# Patient Record
Sex: FEMALE
[De-identification: names, ages, dates, MRNs, and addresses within clinical notes are randomized per-mention and may not be internally consistent; named-entity substitution may affect disease eponyms.]

---

## 2023-05-09 ENCOUNTER — HOSPITAL ENCOUNTER (EMERGENCY)
Dept: HOSPITAL 97 - ER | Age: 38
Discharge: HOME | End: 2023-05-09
Payer: COMMERCIAL

## 2023-05-09 VITALS — OXYGEN SATURATION: 99 % | TEMPERATURE: 98 F | DIASTOLIC BLOOD PRESSURE: 72 MMHG | SYSTOLIC BLOOD PRESSURE: 118 MMHG

## 2023-05-09 DIAGNOSIS — N39.0: ICD-10-CM

## 2023-05-09 DIAGNOSIS — G40.89: Primary | ICD-10-CM

## 2023-05-09 DIAGNOSIS — S20.213A: ICD-10-CM

## 2023-05-09 LAB
ALBUMIN SERPL BCP-MCNC: 3.8 G/DL (ref 3.4–5)
ALP SERPL-CCNC: 66 U/L (ref 45–117)
ALT SERPL W P-5'-P-CCNC: 18 U/L (ref 13–56)
AST SERPL W P-5'-P-CCNC: 14 U/L (ref 15–37)
BUN BLD-MCNC: 9 MG/DL (ref 7–18)
GLUCOSE SERPLBLD-MCNC: 133 MG/DL (ref 74–106)
HCT VFR BLD CALC: 40.6 % (ref 36–45)
INR BLD: 1.02
LYMPHOCYTES # SPEC AUTO: 0.8 K/UL (ref 0.7–4.9)
MCV RBC: 93.9 FL (ref 80–100)
METHAMPHET UR QL SCN: NEGATIVE
PMV BLD: 7.7 FL (ref 7.6–11.3)
POTASSIUM SERPL-SCNC: 3.7 MEQ/L (ref 3.5–5.1)
RBC # BLD: 4.32 M/UL (ref 3.86–4.86)
THC SERPL-MCNC: POSITIVE NG/ML

## 2023-05-09 PROCEDURE — 36415 COLL VENOUS BLD VENIPUNCTURE: CPT

## 2023-05-09 PROCEDURE — 85025 COMPLETE CBC W/AUTO DIFF WBC: CPT

## 2023-05-09 PROCEDURE — 96375 TX/PRO/DX INJ NEW DRUG ADDON: CPT

## 2023-05-09 PROCEDURE — 80307 DRUG TEST PRSMV CHEM ANLYZR: CPT

## 2023-05-09 PROCEDURE — 72125 CT NECK SPINE W/O DYE: CPT

## 2023-05-09 PROCEDURE — 93005 ELECTROCARDIOGRAM TRACING: CPT

## 2023-05-09 PROCEDURE — 81001 URINALYSIS AUTO W/SCOPE: CPT

## 2023-05-09 PROCEDURE — 85610 PROTHROMBIN TIME: CPT

## 2023-05-09 PROCEDURE — 70450 CT HEAD/BRAIN W/O DYE: CPT

## 2023-05-09 PROCEDURE — 85730 THROMBOPLASTIN TIME PARTIAL: CPT

## 2023-05-09 PROCEDURE — 84484 ASSAY OF TROPONIN QUANT: CPT

## 2023-05-09 PROCEDURE — 80076 HEPATIC FUNCTION PANEL: CPT

## 2023-05-09 PROCEDURE — 71045 X-RAY EXAM CHEST 1 VIEW: CPT

## 2023-05-09 PROCEDURE — 81025 URINE PREGNANCY TEST: CPT

## 2023-05-09 PROCEDURE — 96365 THER/PROPH/DIAG IV INF INIT: CPT

## 2023-05-09 PROCEDURE — 80048 BASIC METABOLIC PNL TOTAL CA: CPT

## 2023-05-09 PROCEDURE — 96367 TX/PROPH/DG ADDL SEQ IV INF: CPT

## 2023-05-09 PROCEDURE — 99284 EMERGENCY DEPT VISIT MOD MDM: CPT

## 2023-05-09 NOTE — XMS REPORT
Continuity of Care Document

                             Created on:May 9, 2023



Patient:MELVI AGUILAR

Sex:Female

:1985

External Reference #:[id number]





Demographics







                          Address                   [address] [place] [address]

 

                          Home Phone                [phone]

 

                          Work Phone                [phone]

 

                          Mobile Phone              [phone]

 

                          Email Address             [email or web address]

 

                          Preferred Language        English

 

                          Marital Status            Unknown

 

                          Congregational Affiliation     Unknown

 

                          Race                      Unknown

 

                          Additional Race(s)        Unavailable

 

                          Ethnic Group              Unknown









Author







                          Organization              [facility]

t

 

                          Address                   [address]



                                                    [place] [address]

 

                          Phone                     [phone]









Support







                Name            Relationship    Address         Phone

 

                MARYLOU FRIED               [address]    [phone]



                                                [place] [address] 

 

                ZAID AGUILAR               Unavailable     [phone]









Care Team Providers







                    Name                Role                Phone

 

                    VENEGASBRIAN RYAN Primary Care Physician Unavailable

 

                    WILFREDO ESCALONA      Attending Clinician Unavailable

 

                    Ebrahim FNP, Wilfredo  Attending Clinician [phone]

 

                    Unknown, Attending  Attending Clinician Unavailable

 

                    Doctor Unassigned, No Name Attending Clinician Unavailable

 

                    Tawana Sanchez RN      Attending Clinician Unavailable

 

                    Only, Ang Db Test   Attending Clinician Unavailable

 

                    Laurence Joe Attending Clinician [phone]

 

                    LAURENCE ADAMSON   Attending Clinician Unavailable

 

                    KAYLAH MENJIVAR    Attending Clinician Unavailable

 

                    Kaylah Orlando Attending Clinician [phone]

 

                    LORRAINE LANGSTON        Attending Clinician Unavailable

 

                    Kian GARCIAPLorraine    Attending Clinician [phone]

 

                    Provider, [facility] Attending Clinician Unavailable

 

                    Kahti Elias RN  Attending Clinician Unavailable

 

                    MINISTERIO BRYAN        Attending Clinician Unavailable

 

                    Irvin ARREOLA, Ministerio     Attending Clinician [phone]

 

                    Jay Jay Mckeon DO Attending Clinician [phone]

 

                    2, [facility]          Attending Clinician Unavailable

 

                    Heriberto ARREOLA, Mehdi Attending Clinician [phone]

 

                    MEHDI LOUIS    Attending Clinician Unavailable

 

                    Lab, [facility]  Attending Clinician Unavailable

 

                    Pinky Koch  Attending Clinician [phone]

 

                    PINKY LEE      Attending Clinician Unavailable

 

                    Meme Saldana RN      Attending Clinician Unavailable

 

                    JEANMARIE RODRIGUEZ Attending Clinician Unavailable

 

                    [facility] Attending Clinician Unavailable

 

                    BRIAN VENEGAS Attending Clinician Unavailable

 

                    RENÉE POWER  Attending Clinician Unavailable

 

                    SANDY RODRIGUEZ Attending Clinician Unavailable

 

                    SANDY RODRIGUEZ Attending Clinician Unavailable

 

                    KAYLAH MENJIVAR    Admitting Clinician Unavailable

 

                    SANDY RODRIGUEZ Admitting Clinician Unavailable









Payers







           Payer Name Policy Type Policy Number Effective Date Expiration Date S

ource







Problems







       Condition Condition Condition Status Onset  Resolution Last   Treating Co

mments 

Source



       Name   Details Category        Date   Date   Treatment Clinician        



                                                 Date                 

 

       Need for Need for Disease Active                              Unive

rs



       vaccinatio vaccinatio               1-19                               it

y of



       n with n with               00:00:                             [place]



       13-polyval 13-polyval               00                                 Me

dical



       ent    ent                                                     Branch



       pneumococc pneumococc                                                  



       al     al                                                      



       conjugate conjugate                                                  



       vaccine vaccine                                                  

 

       Need for Need for Disease Active                              Unive

rs



       vaccinatio vaccinatio               1-19                               it

y of



       n with n with               00:00:                             [place]



       13-polyval 13-polyval               00                                 Me

dical



       ent    ent                                                     Branch



       pneumococc pneumococc                                                  



       al     al                                                      



       conjugate conjugate                                                  



       vaccine vaccine                                                  

 

       ADHD   ADHD   Disease Active                              [facility]



       (attention (attention               1-15                               it

y of



       deficit deficit               00:00:                             [place]



       hyperactiv hyperactiv               00                                 Me

dical



       ity    ity                                                     Branch



       disorder), disorder),                                                  



       combined combined                                                  



       type   type                                                    

 

       Anxiety, Anxiety, Disease Active                              Unive

rs



       generalize generalize               1-15                               it

y of



       d      d                    00:00:                             [place]



                                   00                                 Medical



                                                                      Branch

 

       Stress at Stress at Disease Active                              Uni

vers



       home   home                 1-15                               ity of



                                   00:00:                             [place]



                                   00                                 Medical



                                                                      Branch

 

       Current Current Disease Active                              Univers



       mild   mild                 1-15                               ity of



       episode of episode of               00:00:                             [place]

[place]



       major  major                00                                 Medical



       depressive depressive                                                  Br

anch



       disorder, disorder,                                                  



       unspecifie unspecifie                                                  



       d whether d whether                                                  



       recurrent recurrent                                                  

 

       Adjustment Adjustment Disease Active                              U

nivers



       insomnia insomnia               1-15                               ity of



                                   00:00:                             [place]



                                   00                                 Medical



                                                                      Branch

 

       Bronchitis Bronchitis Disease Active                              U

nivers



                                   4-28                               ity of



                                   00:00:                             [place]



                                   00                                 Medical



                                                                      Branch

 

       Nose   Nose   Disease Active                              Univers



       congestion congestion               4-28                               it

y of



                                   00:00:                             [place]



                                   00                                 Medical



                                                                      Branch

 

       Exposure Exposure Disease Active                              Unive

rs



       to     to                   4-28                               ity of



       SARS-assoc SARS-assoc               00:00:                             Te

shantells



       iated  iated                00                                 Medical



       coronaviru coronaviru                                                  Br

anch



       s      s                                                       

 

       Acute  Acute  Disease Active 2020-                             Univers



       bacterial bacterial               3-17                               ity 

of



       pharyngiti pharyngiti               00:00:                             [place]

[place]



       s      s                    00                                 Medical



                                                                      Branch

 

       Sore   Sore   Disease Active 2020-                             Univers



       throat throat               3-17                               ity of



                                   00:00:                             [place]



                                   00                                 Medical



                                                                      Branch

 

       Nicotine Nicotine Disease Active -                             Unive

rs



       dependence dependence               3-17                               it

y of



       ,      ,                    00:00:                             [place]



       cigarettes cigarettes               00                                 Me

dical



       ,      ,                                                       Branch



       uncomplica uncomplica                                                  



       julius    julius                                                     

 

       Cough  Cough  Disease Active 2020-                             Univers



                                   3-17                               ity of



                                   00:00:                             [place]



                                   00                                 Medical



                                                                      Branch

 

       Status Status Disease Active 2019-                             [facility]



       post tubal post tubal               4-12                               it

y of



       ligation ligation               00:00:                             [place]



                                   00                                 Medical



                                                                      Branch

 

       Shoulder Shoulder Disease Active                              Unive

rs



       dystocia dystocia               4-12                               ity of



       during during               00:00:                             [place]



       labor and labor and               00                                 Medi

sapna



       delivery delivery                                                  Branch

 

       History of History of Disease Active 2019                             U

nivers



       seizure seizure               4-12                               ity of



                                   00:00:                             [place]



                                   00                                 Medical



                                                                      Branch

 

       H/O    H/O    Disease Active 2019-                             Univers



       maternal maternal               1-24                               ity of



       cardiomyop cardiomyop               00:00:                             Te

lorelei ramos,  athangie,                00                                 Medical



       currently currently                                                  Bran

ch



       pregnant, pregnant,                                                  



       third  third                                                   



       trimester trimester                                                  

 

       History of History of Disease Active 2018                      Overview

: Univers



       echocardio echocardio               1-                        Formattin

 ity of



       gram   gram                 00:00:                      g of this [place]



                                   00                          note   Medical



                                                               might be Branch



                                                               different 



                                                               from the 



                                                               original. 



                                                               See    



                                                               results 



                                                               from   



                                                               2018 in 



                                                               chart  



                                                               review 

 

       Cervical Cervical Disease Active 2018                      Overview: Un

leslie



       Papanicola Papanicola               1-                        Formattin

 ity of



       ou smear ou smear               00:00:                      g of this [place]

as



       negative negative               00                          note   Medica

l



       within within                                           might be Branch



       last 12 last 12                                           different 



       months months                                           from the 



                                                               original. 



                                                               2018 



                                                               neg pap 



                                                               and neg 



                                                               hpv see 



                                                               scanned 



                                                               records 

 

       Postpartum Postpartum Disease Active 2018                      Overview

: Univers



       cardiomyop cardiomyop               0-29                        Formattin

 ity of



       athy   athy                 00:00:                      g of this [place]



                                   00                          note   Medical



                                                               might be Branch



                                                               different 



                                                               from the 



                                                               original. 



                                                               Reported 



                                                               with 2008 



                                                               pregnancy 



                                                               2018 



                                                               normal 



                                                               echo, see 



                                                               scanned 



                                                               records 







Allergies, Adverse Reactions, Alerts







       Allergy Allergy Status Severity Reaction(s) Onset  Inactive Treating Comm

ents 

Source



       Name   Type                        Date   Date   Clinician        

 

       NO KNOWN Drug   Active                                           Univers



       ALLERGIE Class                                                   ity of



       S                                                              [facility]







Social History







           Social Habit Start Date Stop Date  Quantity   Comments   Source

 

           Exposure to 2023 Not sure              [facility]



           SARS-CoV-2 00:00:00   10:37:00                         [facility]



           (event)                                                Branch

 

           Alcohol intake 2022 0 /d                  [facility]



                      00:00:00   00:00:00                         [facility]

 

           Tobacco use and 2020 Smokeless tobacco            Un

iversity of



           exposure   00:00:00   00:00:00   non-user              [facility]

 

           Tobacco Comment 2018-10-29 2018-10-29 d[facility]            [facility]



                      00[facility]   00:00:00   she found out            [place] [facility]

al



                                            about pregnancy            Branch

 

           History of            2018 Cigarette Smoker            Universi

ty of



           tobacco use            00:00:00                         [facility]

 

           Sex Assigned At 1985                       [facility]

y of



           Birth      00:00:00   00:00:00                         [facility]









                Smoking Status  Start Date      Stop Date       Source

 

                Tobacco smoking consumption                                 [facility]                                         [facility]

 

                Occasional tobacco smoker 2020 00:00:00                 Un

iversity of [facility]







Medications







       Ordered Filled Start  Stop   Current Ordering Indication Dosage Frequency

 Signature

                    Comments            Components          Source



     Medication Medication Date Date Medication? Clinician                (SIG) 

          



     Name Name                                                   

 

     amoxicillin      2023- Yes       [id number] 875mg      Take 1        

   Univers



     875 mg      2-14 -25                          tablet by           ity of



     tablet      00:00: 05:59                          mouth in           [place]



               00   :00                           the            Medical



                                                  morning           Branch



                                                  and 1           



                                                  tablet in           



                                                  the            



                                                  evening.           



                                                  Do all           



                                                  this for           



                                                  10 days.           

 

     amoxicillin      2023- Yes       [id number] 875mg      Take 1        

   Univers



     875 mg      2-14 02-25                          tablet by           ity of



     tablet      00:00: 05:59                          mouth in           [place]



               00   :00                           the            Medical



                                                  morning           Branch



                                                  and 1           



                                                  tablet in           



                                                  the            



                                                  evening.           



                                                  Do all           



                                                  this for           



                                                  10 days.           

 

     ondansetron      2022- No             8mg       8 mg, Slow          

 Univers



     (ZOFRAN      --04                          IV Push,           ity of



     (PF))      18:45: 17:55                          ONCE, 1           [place]



     injection 8      00   :00                           dose, On           Medi

sapna



     mg                                           Wed 22           Branch



                                                  at 1345,           



                                                  ASAP           

 

     NaCl 0.9%      -0 - No             1000mL      at 999           Uni

vers



     (NS) bolus      5-04 05-04                          mL/hr,           ity of



     infusion      18:45: 20:16                          1,000 mL,           [place]

as



     1,000 mL      00   :00                           IV             Medical



                                                  Infusion,           Branch



                                                  ONCE, 1           



                                                  dose, On           



                                                  Wed 22           



                                                  at 1345,           



                                                  ASAP           

 

     dicyclomine      -0      Yes       [id number] 20mg      Take 1           U

nivers



     20 mg      5-04                               tablet by           ity of



     tablet      00:00:                               mouth 4           [place]



               00                                 (four)           Medical



                                                  times           Branch



                                                  daily as           



                                                  needed for           



                                                  Abdominal           



                                                  pain.           

 

     ondansetron      -0      Yes       [id number] 4mg       Take 1           U

nivers



     4 mg      5-04                               tablet by           ity of



     disintegrat      00:00:                               mouth           [place]



     ing tablet      00                                 every 8           Medica

l



                                                  (eight)           Branch



                                                  hours as           



                                                  needed for           



                                                  Nausea and           



                                                  Vomiting           



                                                  (N/V).           

 

     dicyclomine      -0      Yes       [id number] 20mg      Take 1           [facility]

[facility]



     20 mg      5-04                               tablet by           [facility]



     tablet      00:00:                               mouth 4           [place]



               00                                 (four)           [facility]



                                                  times           [facility]



                                                  daily as           



                                                  needed for           



                                                  Abdominal           



                                                  pain.           

 

     ondansetron      -0      Yes       [id number] 4mg       Take 1           [facility]

[facility]



     4 mg      5-04                               tablet by           [facility]



     disintegrat      00:00:                               mouth           [place]



     ing tablet      00                                 every 8           [facility]

l



                                                  (eight)           [facility]



                                                  hours as           



                                                  needed for           



                                                  Nausea and           



                                                  Vomiting           



                                                  (N/V).           

 

     dicyclomine      -0      Yes       [id number] 20mg      Take 1           [facility]

[facility]



     20 mg      5-04                               tablet by           [facility]



     tablet      00:00:                               mouth 4           [place]



               00                                 (four)           [facility]



                                                  times           [facility]



                                                  daily as           



                                                  needed for           



                                                  Abdominal           



                                                  pain.           

 

     ondansetron      2-0      Yes       [id number] 4mg       Take 1           [facility]

[facility]



     4 mg      5-04                               tablet by           [facility]



     disintegrat      00:00:                               mouth           [place]



     ing tablet      00                                 every 8           [facility]

l



                                                  (eight)           [facility]



                                                  hours as           



                                                  needed for           



                                                  Nausea and           



                                                  Vomiting           



                                                  (N/V).           

 

     dicyclomine      2-0      Yes       [id number] 20mg      Take 1           [facility]

[facility]



     20 mg      5-04                               tablet by           [facility]



     tablet      00:00:                               mouth 4           [place]



               00                                 (four)           [facility]



                                                  times           [facility]



                                                  daily as           



                                                  needed for           



                                                  Abdominal           



                                                  pain.           

 

     ondansetron      2-0      Yes       [id number] 4mg       Take 1           [facility]

[facility]



     4 mg      5-04                               tablet by           [facility]



     disintegrat      00:00:                               mouth           [place]



     ing tablet      00                                 every 8           [facility]

l



                                                  (eight)           [facility]



                                                  hours as           



                                                  needed for           



                                                  Nausea and           



                                                  Vomiting           



                                                  (N/V).           

 

     dicyclomine      2-0      Yes       [id number] 20mg      Take 1           [facility]

[facility]



     20 mg      5-04                               tablet by           [facility]



     tablet      00:00:                               mouth 4           [place]



               00                                 (four)           [facility]



                                                  times           [facility]



                                                  daily as           



                                                  needed for           



                                                  Abdominal           



                                                  pain.           

 

     ondansetron      -0      Yes       [id number] 4mg       Take 1           [facility]

[facility]



     4 mg      5-04                               tablet by           [facility]



     disintegrat      00:00:                               mouth           [place]



     ing tablet      00                                 every 8           [facility]

l



                                                  (eight)           [facility]



                                                  hours as           



                                                  needed for           



                                                  Nausea and           



                                                  Vomiting           



                                                  (N/V).           

 

     dicyclomine      -0      Yes       [id number] 20mg      Take 1           [facility]

[facility]



     20 mg      5-04                               tablet by           [facility]



     tablet      00:00:                               mouth 4           [place]



               00                                 (four)           [facility]



                                                  times           [facility]



                                                  daily as           



                                                  needed for           



                                                  Abdominal           



                                                  pain.           

 

     ondansetron      -0      Yes       [id number] 4mg       Take 1           [facility]

[facility]



     4 mg      5-04                               tablet by           [facility]



     disintegrat      00:00:                               mouth           [place]



     ing tablet      00                                 every 8           [facility]

l



                                                  (eight)           [facility]



                                                  hours as           



                                                  needed for           



                                                  Nausea and           



                                                  Vomiting           



                                                  (N/V).           

 

     dicyclomine      -0      Yes       [id number] 20mg      Take 1           [facility]

[facility]



     20 mg      5-04                               tablet by           [facility]



     tablet      00:00:                               mouth 4           [place]



               00                                 (four)           [facility]



                                                  times           [facility]



                                                  daily as           



                                                  needed for           



                                                  Abdominal           



                                                  pain.           

 

     ondansetron      -0      Yes       [id number] 4mg       Take 1           [facility]

[facility]



     4 mg      5-04                               tablet by           [facility]



     disintegrat      00:00:                               mouth           [place]



     ing tablet      00                                 every 8           [facility]

l



                                                  (eight)           [facility]



                                                  hours as           



                                                  needed for           



                                                  Nausea and           



                                                  Vomiting           



                                                  (N/V).           

 

     ondansetron      -      Yes       [id number] 4mg       Take 1           

[facility]



     4 mg      2-13                               tablet by           [facility]



     disintegrat      00:00:                               mouth           [place]



     ing tablet      00                                 every 8           [facility]

l



                                                  (eight)           [facility]



                                                  hours as           



                                                  needed for           



                                                  Nausea and           



                                                  Vomiting           



                                                  (N/V).           

 

     ondansetron      -      Yes       [id number] 4mg       Take 1           

[facility]



     4 mg      2-13                               tablet by           [facility]



     disintegrat      00:00:                               mouth           [place]



     ing tablet      00                                 every 8           [facility]

l



                                                  (eight)           [facility]



                                                  hours as           



                                                  needed for           



                                                  Nausea and           



                                                  Vomiting           



                                                  (N/V).           

 

     ondansetron      2021      Yes       [id number] 4mg       Take 1           

[facility]



     4 mg      2-13                               tablet by           [facility]



     disintegrat      00:00:                               mouth           [place]



     ing tablet      00                                 every 8           [facility]

l



                                                  (eight)           [facility]



                                                  hours as           



                                                  needed for           



                                                  Nausea and           



                                                  Vomiting           



                                                  (N/V).           

 

     ondansetron      2021      Yes       [id number] 4mg       Take 1           

[facility]



     4 mg      2-13                               tablet by           [facility]



     disintegrat      00:00:                               mouth           [place]



     ing tablet      00                                 every 8           [facility]

l



                                                  (eight)           [facility]



                                                  hours as           



                                                  needed for           



                                                  Nausea and           



                                                  Vomiting           



                                                  (N/V).           

 

     ondansetron      2021      Yes       [id number] 4mg       Take 1           

[facility]



     4 mg      2-13                               tablet by           [facility]



     disintegrat      00:00:                               mouth           [place]



     ing tablet      00                                 every 8           [facility]

l



                                                  (eight)           [facility]



                                                  hours as           



                                                  needed for           



                                                  Nausea and           



                                                  Vomiting           



                                                  (N/V).           

 

     ondansetron      2021      Yes       [id number] 4mg       Take 1           

[facility]



     4 mg      2-13                               tablet by           [facility]



     disintegrat      00:00:                               mouth           [place]



     ing tablet      00                                 every 8           [facility]

l



                                                  (eight)           [facility]



                                                  hours as           



                                                  needed for           



                                                  Nausea and           



                                                  Vomiting           



                                                  (N/V).           

 

     ondansetron      2021      Yes       [id number] 4mg       Take 1           

[facility]



     4 mg      2-13                               tablet by           [facility]



     disintegrat      00:00:                               mouth           [place]



     ing tablet      00                                 every 8           [facility]

l



                                                  (eight)           [facility]



                                                  hours as           



                                                  needed for           



                                                  Nausea and           



                                                  Vomiting           



                                                  (N/V).           

 

     ondansetron      2021      Yes       [id number] 4mg       Take 1           

[facility]



     4 mg      2-13                               tablet by           [facility]



     disintegrat      00:00:                               mouth           [place]



     ing tablet      00                                 every 8           [facility]

l



                                                  (eight)           [facility]



                                                  hours as           



                                                  needed for           



                                                  Nausea and           



                                                  Vomiting           



                                                  (N/V).           

 

     ondansetron      2021      Yes       [id number] 4mg       Take 1           

[facility]



     4 mg      2-13                               tablet by           [facility]



     disintegrat      00:00:                               mouth           [place]



     ing tablet      00                                 every 8           [facility]

l



                                                  (eight)           [facility]



                                                  hours as           



                                                  needed for           



                                                  Nausea and           



                                                  Vomiting           



                                                  (N/V).           

 

     ondansetron      2021      Yes       [id number] 4mg       Take 1           

[facility]



     4 mg      2-13                               tablet by           [facility]



     disintegrat      00:00:                               mouth           [place]



     ing tablet      00                                 every 8           [facility]

l



                                                  (eight)           [facility]



                                                  hours as           



                                                  needed for           



                                                  Nausea and           



                                                  Vomiting           



                                                  (N/V).           

 

     ondansetron      2021      Yes       [id number] 4mg       Take 1           

[facility]



     4 mg      2-13                               tablet by           [facility]



     disintegrat      00:00:                               mouth           [place]



     ing tablet      00                                 every 8           [facility]

l



                                                  (eight)           [facility]



                                                  hours as           



                                                  needed for           



                                                  Nausea and           



                                                  Vomiting           



                                                  (N/V).           

 

     ondansetron      2021      Yes       [id number] 4mg       Take 1           

[facility]



     4 mg      2-13                               tablet by           [facility]



     disintegrat      00:00:                               mouth           [place]



     ing tablet      00                                 every 8           [facility]

l



                                                  (eight)           [facility]



                                                  hours as           



                                                  needed for           



                                                  Nausea and           



                                                  Vomiting           



                                                  (N/V).           

 

     ondansetron      2021      Yes       [id number] 4mg       Take 1           

[facility]



     4 mg      2-13                               tablet by           [facility]



     disintegrat      00:00:                               mouth           [place]



     ing tablet      00                                 every 8           [facility]

l



                                                  (eight)           [facility]



                                                  hours as           



                                                  needed for           



                                                  Nausea and           



                                                  Vomiting           



                                                  (N/V).           

 

     ondansetron      2021      Yes       [id number] 4mg       Take 1           

[facility]



     4 mg      2-13                               tablet by           [facility]



     disintegrat      00:00:                               mouth           [place]



     ing tablet      00                                 every 8           [facility]

l



                                                  (eight)           [facility]



                                                  hours as           



                                                  needed for           



                                                  Nausea and           



                                                  Vomiting           



                                                  (N/V).           

 

     ondansetron      2021- No        [id number] 4mg       Take 1         

  [facility]



     4 mg      0-11 12-13                          tablet by           [facility]



     disintegrat      00:00: 00:00                          mouth           [place]

[facility]



     ing tablet      00   :00                           every 8           [facility]

l



                                                  (eight)           [facility]



                                                  hours as           



                                                  needed for           



                                                  Nausea and           



                                                  Vomiting           



                                                  (N/V).           

 

     amphetamine            Yes       [id number] 5mg       Take 1           

[facility]



     -dextroamph      1-15                               capsule by           [facility]

y of



     etamine      00:00:                               mouth           [place]



     (ADDERALL      00                                 every           [facility]



     XR) 5 mg 24                                         morning.           [facility]

[facility]



     hr capsule                                                        

 

     FLUoxetine            Yes       [id number] 10mg      Take 1           [facility]

[facility]



     10 mg      1-15                               capsule by           [facility]



     capsule      00:00:                               mouth           [place]



               00                                 daily.           [facility]



                                                                 [facility]

 

     traZODone            Yes       [id number] 50mg      Take 1           [facility]

[facility]



     50 mg      1-15                               tablet by           [facility]



     tablet      00:00:                               mouth at           [place]



               00                                 bedtime.           [facility]



                                                                 [facility]

 

     amphetamine            Yes       [id number] 5mg       Take 1           

[facility]



     -dextroamph      1-15                               capsule by           [facility]

y of



     etamine      00:00:                               mouth           [place]



     (ADDERALL      00                                 every           [facility]



     XR) 5 mg 24                                         morning.           [facility]

[facility]



     hr capsule                                                        

 

     FLUoxetine            Yes       [id number] 10mg      Take 1           [facility]

[facility]



     10 mg      1-15                               capsule by           [facility]



     capsule      00:00:                               mouth           [place]



               00                                 daily.           [facility]



                                                                 [facility]

 

     traZODone            Yes       [id number] 50mg      Take 1           [facility]

[facility]



     50 mg      1-15                               tablet by           [facility]



     tablet      00:00:                               mouth at           [place]



               00                                 bedtime.           [facility]



                                                                 [facility]

 

     amphetamine      0      Yes       [id number] 5mg       Take 1           

[facility]



     -dextroamph      1-15                               capsule by           [facility]

y of



     etamine      00:00:                               mouth           [place]



     (ADDERALL      00                                 every           [facility]



     XR) 5 mg 24                                         morning.           [facility]

[facility]



     hr capsule                                                        

 

     FLUoxetine            Yes       [id number] 10mg      Take 1           [facility]

[facility]



     10 mg      1-15                               capsule by           [facility]



     capsule      00:00:                               mouth           [place]



               00                                 daily.           [facility]



                                                                 [facility]

 

     traZODone            Yes       [id number] 50mg      Take 1           [facility]

[facility]



     50 mg      1-15                               tablet by           [facility]



     tablet      00:00:                               mouth at           [place]



               00                                 bedtime.           Medical



                                                                 Branch

 

     amphetamine            Yes       [id number] 5mg       Take 1           

[facility]



     -dextroamph      1-15                               capsule by           it

y of



     etamine      00:00:                               mouth           [place]



     (ADDERALL      00                                 every           Medical



     XR) 5 mg 24                                         morning.           [facility]

ch



     hr capsule                                                        

 

     FLUoxetine            Yes       [id number] 10mg      Take 1           U

nivers



     10 mg      1-15                               capsule by           [facility] of



     capsule      00:00:                               mouth           [place]



               00                                 daily.           Medical



                                                                 Branch

 

     traZODone            Yes       [id number] 50mg      Take 1           U

nivers



     50 mg      1-15                               tablet by           ity of



     tablet      00:00:                               mouth at           [place]



               00                                 bedtime.           Medical



                                                                 Branch

 

     amphetamine            Yes       [id number] 5mg       Take 1           

[facility]



     -dextroamph      1-15                               capsule by           it

y of



     etamine      00:00:                               mouth           [place]



     (ADDERALL      00                                 every           Medical



     XR) 5 mg 24                                         morning.           [facility]

ch



     hr capsule                                                        

 

     FLUoxetine            Yes       [id number] 10mg      Take 1           U

nivers



     10 mg      1-15                               capsule by           [facility] of



     capsule      00:00:                               mouth           [place]



               00                                 daily.           Medical



                                                                 Branch

 

     traZODone            Yes       [id number] 50mg      Take 1           U

nivers



     50 mg      1-15                               tablet by           ity of



     tablet      00:00:                               mouth at           [place]



               00                                 bedtime.           Medical



                                                                 Branch

 

     amphetamine            Yes       [id number] 5mg       Take 1           

[facility]



     -dextroamph      1-15                               capsule by           it

y of



     etamine      00:00:                               mouth           [place]



     (ADDERALL      00                                 every           Medical



     XR) 5 mg 24                                         morning.           [facility]

ch



     hr capsule                                                        

 

     FLUoxetine            Yes       [id number] 10mg      Take 1           U

nivers



     10 mg      1-15                               capsule by           [facility] of



     capsule      00:00:                               mouth           [place]



               00                                 daily.           Medical



                                                                 Branch

 

     traZODone            Yes       [id number] 50mg      Take 1           U

nivers



     50 mg      1-15                               tablet by           [facility] of



     tablet      00:00:                               mouth at           [place]



               00                                 bedtime.           Medical



                                                                 Branch

 

     amphetamine            Yes       [id number] 5mg       Take 1           

[facility]



     -dextroamph      1-15                               capsule by           it

y of



     etamine      00:00:                               mouth           [place]



     (ADDERALL      00                                 every           Medical



     XR) 5 mg 24                                         morning.           [facility]

ch



     hr capsule                                                        

 

     FLUoxetine            Yes       [id number] 10mg      Take 1           U

nivers



     10 mg      1-15                               capsule by           ity of



     capsule      00:00:                               mouth           [place]



               00                                 daily.           Medical



                                                                 Branch

 

     traZODone            Yes       [id number] 50mg      Take 1           U

nivers



     50 mg      1-15                               tablet by           ity of



     tablet      00:00:                               mouth at           [place]



               00                                 bedtime.           Medical



                                                                 Branch

 

     amphetamine            Yes       [id number] 5mg       Take 1           

[facility]



     -dextroamph      1-15                               capsule by           it

y of



     etamine      00:00:                               mouth           [place]



     (ADDERALL      00                                 every           Medical



     XR) 5 mg 24                                         morning.           [facility]

ch



     hr capsule                                                        

 

     FLUoxetine            Yes       [id number] 10mg      Take 1           U

nivers



     10 mg      1-15                               capsule by           ity of



     capsule      00:00:                               mouth           [place]



               00                                 daily.           Medical



                                                                 Branch

 

     traZODone            Yes       [id number] 50mg      Take 1           U

nivers



     50 mg      1-15                               tablet by           ity of



     tablet      00:00:                               mouth at           [place]



               00                                 bedtime.           Medical



                                                                 Branch

 

     amphetamine            Yes       [id number] 5mg       Take 1           

[facility]



     -dextroamph      1-15                               capsule by           it

y of



     etamine      00:00:                               mouth           [place]



     (ADDERALL      00                                 every           Medical



     XR) 5 mg 24                                         morning.           [facility]

ch



     hr capsule                                                        

 

     FLUoxetine            Yes       [id number] 10mg      Take 1           U

nivers



     10 mg      1-15                               capsule by           ity of



     capsule      00:00:                               mouth           [place]



               00                                 daily.           Medical



                                                                 Branch

 

     traZODone            Yes       [id number] 50mg      Take 1           U

nivers



     50 mg      1-15                               tablet by           ity of



     tablet      00:00:                               mouth at           [place]



               00                                 bedtime.           Medical



                                                                 Branch

 

     amphetamine      -0      Yes       [id number] 5mg       Take 1           

[facility]



     -dextroamph      1-15                               capsule by           it

y of



     etamine      00:00:                               mouth           [place]



     (ADDERALL      00                                 every           Medical



     XR) 5 mg 24                                         morning.           [facility]

ch



     hr capsule                                                        

 

     FLUoxetine      -0      Yes       [id number] 10mg      Take 1           U

nivers



     10 mg      1-15                               capsule by           [facility] of



     capsule      00:00:                               mouth           [place]



               00                                 daily.           Medical



                                                                 Branch

 

     traZODone      -0      Yes       [id number] 50mg      Take 1           U

nivers



     50 mg      1-15                               tablet by           [facility] of



     tablet      00:00:                               mouth at           [place]



               00                                 bedtime.           Medical



                                                                 Branch

 

     amphetamine      -0      Yes       [id number] 5mg       Take 1           

[facility]



     -dextroamph      1-15                               capsule by           it

y of



     etamine      00:00:                               mouth           [place]



     (ADDERALL      00                                 every           Medical



     XR) 5 mg 24                                         morning.           [facility]

ch



     hr capsule                                                        

 

     FLUoxetine      -0      Yes       [id number] 10mg      Take 1           U

nivers



     10 mg      1-15                               capsule by           [facility] of



     capsule      00:00:                               mouth           [place]



               00                                 daily.           Medical



                                                                 Branch

 

     traZODone      0      Yes       [id number] 50mg      Take 1           U

nivers



     50 mg      1-15                               tablet by           ity of



     tablet      00:00:                               mouth at           [place]



               00                                 bedtime.           Medical



                                                                 Branch

 

     amphetamine      -0      Yes       [id number] 5mg       Take 1           

[facility]



     -dextroamph      1-15                               capsule by           it

y of



     etamine      00:00:                               mouth           [place]



     (ADDERALL      00                                 every           Medical



     XR) 5 mg 24                                         morning.           [facility]

ch



     hr capsule                                                        

 

     FLUoxetine      -0      Yes       [id number] 10mg      Take 1           U

nivers



     10 mg      1-15                               capsule by           ity of



     capsule      00:00:                               mouth           [place]



               00                                 daily.           Medical



                                                                 Branch

 

     traZODone      -0      Yes       [id number] 50mg      Take 1           U

nivers



     50 mg      1-15                               tablet by           ity of



     tablet      00:00:                               mouth at           [place]



               00                                 bedtime.           Medical



                                                                 Branch

 

     amphetamine      -0      Yes       [id number] 5mg       Take 1           

[facility]



     -dextroamph      1-15                               capsule by           it

y of



     etamine      00:00:                               mouth           [place]



     (ADDERALL      00                                 every           Medical



     XR) 5 mg 24                                         morning.           [facility]

ch



     hr capsule                                                        

 

     FLUoxetine      -0      Yes       [id number] 10mg      Take 1           U

nivers



     10 mg      1-15                               capsule by           ity of



     capsule      00:00:                               mouth           [place]



               00                                 daily.           Medical



                                                                 Branch

 

     traZODone            Yes       [id number] 50mg      Take 1           U

nivers



     50 mg      1-15                               tablet by           ity of



     tablet      00:00:                               mouth at           [place]



               00                                 bedtime.           Medical



                                                                 Branch

 

     amphetamine            Yes       [id number] 5mg       Take 1           

[facility]



     -dextroamph      1-15                               capsule by           it

y of



     etamine      00:00:                               mouth           [place]



     (ADDERALL      00                                 every           Medical



     XR) 5 mg 24                                         morning.           [facility]

ch



     hr capsule                                                        

 

     FLUoxetine            Yes       [id number] 10mg      Take 1           U

nivers



     10 mg      1-15                               capsule by           ity of



     capsule      00:00:                               mouth           [place]



               00                                 daily.           Medical



                                                                 Branch

 

     traZODone            Yes       [id number] 50mg      Take 1           U

nivers



     50 mg      1-15                               tablet by           ity of



     tablet      00:00:                               mouth at           [place]



               00                                 bedtime.           Medical



                                                                 Branch







Immunizations







           Ordered    Filled Immunization Date       Status     Comments   [facility]

e



           Immunization Name Name                                        

 

           Pneumococcal 13            2021-01-15 Completed             [facility]

[facility]



           Conjugate, PCV13            00:00:00                         [place] [facility]

[facility]



           (Prevnar 13)                                             Branch

 

           Pneumococcal 13            2021-01-15 Completed             [facility]

y of



           Conjugate, PCV13            00:00:00                         [place] [facility]

[facility]



           (Prevnar 13)                                             [facility]

 

           Pneumococcal 13            2021-01-15 Completed             [facility]

y of



           Conjugate, PCV13            00:00:00                         [place] [facility]

[facility]



           (Prevnar 13)                                             [facility]anch

 

           Pneumococcal 13            2021-01-15 Completed             [facility]

[facility] of



           Conjugate, PCV13            00:00:00                         [place] [facility]

[facility]



           (Prevnar 13)                                             [facility]anch

 

           Pneumococcal 13            2021-01-15 Completed             [facility]

[facility] of



           Conjugate, PCV13            00:00:00                         [place] [facility]

[facility]



           (Prevnar 13)                                             [facility]anch

 

           Pneumococcal 13            2021-01-15 Completed             [facility]

y of



           Conjugate, PCV13            00:00:00                         [place] [facility]

[facility]



           (Prevnar 13)                                             Branch

 

           Pneumococcal 13            2021-01-15 Completed             [facility]

y of



           Conjugate, PCV13            00:00:00                         [place] [facility]

[facility]



           (Prevnar 13)                                             Branch

 

           Pneumococcal 13            2021-01-15 Completed             [facility]

[facility] of



           Conjugate, PCV13            00:00:00                         [place] [facility]

[facility]



           (Prevnar 13)                                             Branch

 

           Pneumococcal 13            2021-01-15 Completed             [facility]

y of



           Conjugate, PCV13            00:00:00                         [place] [facility]

[facility]



           (Prevnar 13)                                             Branch

 

           Pneumococcal 13            2021-01-15 Completed             [facility]

y of



           Conjugate, PCV13            00:00:00                         [place] [facility]

[facility]



           (Prevnar 13)                                             Branch

 

           Pneumococcal 13            2021-01-15 Completed             [facility]

y of



           Conjugate, PCV13            00:00:00                         [place] [facility]

[facility]



           (Prevnar 13)                                             Branch

 

           Pneumococcal 13            2021-01-15 Completed             [facility]

y of



           Conjugate, PCV13            00:00:00                         [place] [facility]

[facility]



           (Prevnar 13)                                             Branch

 

           Pneumococcal 13            2021-01-15 Completed             [facility]

y of



           Conjugate, PCV13            00:00:00                         [place] [facility]

[facility]



           (Prevnar 13)                                             Branch

 

           Pneumococcal 13            2021-01-15 Completed             [facility]

y of



           Conjugate, PCV13            00:00:00                         [place] [facility]

[facility]



           (Prevnar 13)                                             Branch

 

           TDAP                  2019 Completed             [facility]



                                 00:00:00                         [facility]

 

           TDAP                  2019 Completed             [facility]



                                 00:00:00                         [facility]

 

           TDAP                  2019 Completed             [facility]



                                 00:00:00                         [facility]

 

           TDAP                  2019 Completed             [facility]



                                 00:00:00                         [facility]

 

           TDAP                  2019 Completed             [facility]



                                 00:00:00                         [facility]

 

           TDAP                  2019 Completed             [facility]



                                 00:00:00                         [facility]

 

           TDAP                  2019 Completed             [facility]



                                 00:00:00                         [facility]

 

           TDAP                  2019 Completed             [facility]



                                 00:00:00                         [facility]

 

           TDAP                  2019 Completed             [facility]



                                 00:00:00                         [facility]

 

           TDAP                  2019 Completed             [facility]



                                 00:00:00                         [facility]

 

           TDAP                  2019 Completed             [facility]



                                 00:00:00                         [facility]

 

           TDAP                  2019 Completed             [facility]



                                 00:00:00                         [facility]

 

           TDAP                  2019 Completed             [facility]



                                 00:00:00                         [facility]

 

           TDAP                  2019 Completed             [facility]



                                 00:00:00                         [facility]

 

           Influenza Virus            2018-10-23 Completed             [facility]

y of



           Vaccine - Whole            00:00:00                         [facility]

 

           Influenza Virus            2018-10-23 Completed             [facility]

y of



           Vaccine - Whole            00:00:00                         [facility]

 

           Influenza Virus            2018-10-23 Completed             [facility]

y of



           Vaccine - Whole            00:00:00                         [facility]

 

           Influenza Virus            2018-10-23 Completed             [facility]

y of



           Vaccine - Whole            00:00:00                         [facility]

 

           Influenza Virus            2018-10-23 Completed             [facility]

y of



           Vaccine - Whole            00:00:00                         [facility]

 

           Influenza Virus            2018-10-23 Completed             [facility]

y of



           Vaccine - Whole            00:00:00                         [facility]

 

           Influenza Virus            2018-10-23 Completed             [facility]

y of



           Vaccine - Whole            00:00:00                         [facility]

 

           Influenza Virus            2018-10-23 Completed             [facility]

y of



           Vaccine - Whole            00:00:00                         [facility]

 

           Influenza Virus            2018-10-23 Completed             [facility]

y of



           Vaccine - Whole            00:00:00                         [facility]

 

           Influenza Virus            2018-10-23 Completed             [facility]

y of



           Vaccine - Whole            00:00:00                         [facility]

 

           Influenza Virus            2018-10-23 Completed             [facility]

y of



           Vaccine - Whole            00:00:00                         [facility]

 

           Influenza Virus            2018-10-23 Completed             [facility]

y of



           Vaccine - Whole            00:00:00                         [facility]

 

           Influenza Virus            2018-10-23 Completed             [facility]

y of



           Vaccine - Whole            00:00:00                         [facility]

 

           Influenza Virus            2018-10-23 Completed             [facility]

y of



           Vaccine - Whole            00:00:00                         [facility]







Vital Signs







             Vital Name   Observation Time Observation Value Comments     Source

 

             Systolic blood 2023 16:50:00 108 mm[Hg]                [facility]

[facility] of



             pressure                                            [facility]

 

             Diastolic blood 2023 16:50:00 76 mm[Hg]                 [facility]

rsity of



             pressure                                            [facility]

 

             Heart rate   2023 16:50:00 84 /min                   [facility]

 

             Body temperature 2023 16:50:00 37.28 [place]                 [facility]

[facility]

 

             Respiratory rate 2023 16:50:00 18 /min                   [facility]

 

             Body height  2023 16:50:00 162.6 cm                  [facility]

 

             Body weight  2023 16:50:00 63.248 kg                 [facility]

 

             BMI          2023 16:50:00 23.93 kg/m2               [facility]

 

             Oxygen saturation in 2023 16:50:00 97 /min                   

[facility]



             Arterial blood by                                        [place] [facility]

sapna



             Pulse oximetry                                        [facility]

 

             Systolic blood 2022 20:00:00 122 mm[Hg]                Univer

sity of



             pressure                                            [facility]



                                                                 [place]

 

             Diastolic blood 2022 20:00:00 76 mm[Hg]                 Unive

rsity of



             pressure                                            [facility]



                                                                 [facility]

 

             Heart rate   2022 20:00:00 61 /min                   Universi

ty of



                                                                 [facility]



                                                                 [facility]

 

             Respiratory rate 2022 20:00:00 21 /min                   Univ

ersity of



                                                                 [facility]



                                                                 [place]

 

             Oxygen saturation in 2022 20:00:00 98 /min                   

[facility]



             Arterial blood by                                        [place] [facility]

sapna



             Pulse oximetry                                        [facility]

 

             Body temperature 2022 17:33:00 36.22 Sugey                 Univ

ersity of



                                                                 [facility]



                                                                 [place]

 

             Body weight  2022 17:33:00 62.596 kg                 Universi

ty of



                                                                 [facility]



                                                                 [place]

 

             BMI          2022 17:33:00 20.38 kg/m2               Universi

ty of



                                                                 [facility]



                                                                 [facility]

 

             Systolic blood 2021 17:50:00 130 mm[Hg]                Univer

sity of



             pressure                                            [facility]



                                                                 [place]

 

             Diastolic blood 2021 17:50:00 82 mm[Hg]                 Unive

rsity of



             pressure                                            [facility]



                                                                 [place]

 

             Heart rate   2021 17:50:00 81 /min                   Universi

ty of



                                                                 [facility]



                                                                 [place]

 

             Body temperature 2021 17:50:00 36.56 Sugey                 Univ

ersity of



                                                                 [facility]



                                                                 [facility]

 

             Respiratory rate 2021 17:50:00 17 /min                   Univ

ersity of



                                                                 [facility]



                                                                 [facility]

 

             Body height  2021 17:50:00 175.3 cm                  Universi

ty of



                                                                 [facility]



                                                                 [facility]

 

             Body weight  2021 17:50:00 62.143 kg                 Universi

ty of



                                                                 [facility]



                                                                 [facility]

 

             BMI          2021 17:50:00 20.23 kg/m2               Universi

ty of



                                                                 [facility]



                                                                 [facility]

 

             Oxygen saturation in 2021 17:50:00 97 /min                   

[facility]



             Arterial blood by                                        [place] [facility]

sapna



             Pulse oximetry                                        Branch







Procedures







                Procedure       Date / Time     Performing Clinician Source



                                Performed                       

 

                POCT MOLECULAR FLU 2023 16:58:00 Unknown, [facility]

 

                CONSENT/REFUSAL FOR 2023 16:41:02 Doctor Unassigned, Unive

[facility]



                DIAGNOSIS AND TREATMENT                 [place]         [facility] 

Branch

 

                XR CHEST 1 VW   2022 18:24:19 Kaylah Menjivar [facility]

 

                LIPASE          2022 17:51:00 Kaylah Menjivar [facility]

 

                TROPONIN I      2022 17:51:00 Kaylah Menjivar [facility]

 

                COMP. METABOLIC PANEL 2022 17:51:00 Kaylah Menjivar [facility]



                ([id number])                                         [facility]

 

                CBC WITH DIFF   2022 17:51:00 Kaylah Menjivar [facility]

 

                URINALYSIS      2022 17:51:00 Kaylah Menjivar [facility]

 

                N-TERMINAL PRO-BNP 2022 17:51:00 Kaylah Menjivar [facility]

 

                POCT PREGNANCY TEST 2022 17:45:00 Kaylah Menjivar [facility]

 

                NOTICE OF PRIVACY 2022 17:21:40 Doctor Sunny, [facility]



                PRACTICES                       [place]         [facility]

 

                CONSENT/REFUSAL FOR 2022 17:21:28 Doctor Sunny, [facility]



                DIAGNOSIS AND TREATMENT                 [place]         Medical 

[place]

 

                ASSIGNMENT OF BENEFITS 2022 22:07:57 Doctor Sunny, [facility]



                                                [place]         [facility]

 

                POCT MOLECULAR FLU 2021 18:02:00 Ministerio Bryan

[facility]

 

                PATIENT FINANCIAL 1900 06:01:00 Doctor Sunny, [facility]



                RESPONSIBILITY - ALL                 No Name         Medical [facility]



                FORMS                                           







Encounters







        Start   End     Encounter Admission Attending Care    Care    Encounter 

Source



        Date/Time Date/Time Type    Type    Clinicians Facility Department ID   

   

 

        2023 Outpatient R       IQRA [facility]    [id number]

[id number] [facility]



        10:20:00 11:09:10                 WILFREDO                           [facility]

 

        2023 Urgent          Wilfredo Escalona [facility]    1.2.840.114

 [id number] [facility]



        10:20:00 11:09:10 Care            Unknown, Attending HEALTH  350.1.13.10

         [facility] 4.2.7.2.686         [place]

as



                                                LEO?BLEA [phone]         [address]                 



                                                OFFICE                  



                                                [facility]                 

 

        2023 Orders          Doctor  ROSHNI    1.2.840.114 [id number]

[id number] Univers



        00:00:00 00:00:00 Only            Unassigned, KESHAWN   350.1.13.10       

  ity of



                                        [place] [facility] 4.2.7.2.686         [place]

as



                                                        [phone]         Medi

sapna



                                                        [address]             [place]

 

        2023 Letter          Iqra [facility]    1.2.840.114 [id number]

[id number] Univers



        00:00:00 00:00:00 (Out)           Rania   HEALTH  350.1.13.10         it

y of



                                                [place] 4.2.7.2.686         [place]

as



                                                LEO?BLEA [phone]         [address]                 

 

        2022 Telephone         Tawana Sanchez    1.2.840.114 

[id number] Univers



        00:00:00 00:00:00                         KESHAWN   350.1.13.10         it

y of



                                                HOSPITAL 4.2.7.2.686         [place]

as



                                                        [phone]         [address]

 

        2022 Laboratory         Only, Ang Db Test [facility]    1.2.8

40.114 [id number] 

Univers



        19:45:00 20:00:00 Only            Tigre Adamsony HEALTH  350.1.13.10 

        ity of



                                                [place] 4.2.7.2.686         [place]

as



                                                LEO?BLEA [phone]         [address]                 

 

        2022 Outpatient R       JUAN DIEGO [facility]    [id number]

[id number] Univers



        19:45:00 19:38:02                 LAURENCE                         ity o

f



                                                                        [facility]

 

        2022 Letter          Only, Sai [facility]    1.2.430.628 [id number]

6157 Univers



        00:00:00 00:00:00 (Out)           Db Test HEALTH  350.1.13.10         it

y of



                                                [place] 4.2.7.2.686         [place]

as



                                                LEO?BLEA [phone]         [address]                 

 

        2022 Emergency X       TIARA [facility]    ERT     [id number]

[id number] Univers



        12:38:00 15:23:00                 KAYLAH                         ity [facility]

 

        2022 Emergency         Menjivar, [facility]    1.2.840.114 [id number]

[id number] Univers



        12:38:00 15:23:00                 Kaylah DE JESUS 350.1.13.10         i

ty of



                                                [place] 4.2.7.2.686         [place]

[place]



                                                [place]  [phone]         Medi

sapna



                                                        [address]             [place]

 

        2022 Orders          Doctor  ROSHNI    1.2.840.114 [id number]

85 Univers



        00:00:00 00:00:00 Only            Unassigned, KESHAWN   350.1.13.10       

  ity of



                                        [place] [facility] 4.2.7.2.686         [place]

as



                                                        [phone]         [address]

 

        2022 Outpatient R       KIAN  [facility]    [id number]

[id number] Univers



        16:00:00 17:04:28                 LORRAINE                           ity [facility]

 

        2022 Laboratory         Only, Ang Db Test [facility]    1.2.8

40.114 [id number] 

Univers



        16:00:00 16:15:00 Only            Kian Lorraine HEALTH  350.1.13.10      

   ity of



                                                [place] 4.2.7.2.686         [place]

as



                                                LEO?BLEA [phone]         [address]



                                                MEDICAL                 



                                                OFFICE                  



                                                BUILDING                 

 

        2022 Orders          Doctor  ROSHNI    1.2.840.114 [id number]

45 Univers



        00:00:00 00:00:00 Only            Unassigned, KESHAWN   350.1.13.10       

  ity of



                                        [place] [facility] 4.2.7.2.686         [place]

as



                                                        [phone]         [address]

 

        2022 Letter          Provider, [facility]    1.2.990.896 [id number]

[id number] Univers



        00:00:00 00:00:00 (Out)           Ang Db  HEALTH  350.1.13.10         it

y of



                                        [facility] 4.2.7.2.686        

 [place]



                                                LEO?BLEA [phone]         [address]



                                                MEDICAL                 



                                                OFFICE                  



                                                BUILDING                 

 

        2022 Letter          Provider, [facility]    1.2.767.651 [id number]

[id number] Univers



        00:00:00 00:00:00 (Out)           Ang Db  HEALTH  350.1.13.10         it

y of



                                        [facility] 4.2.7.2.686        

 [place]



                                                LEO?BLEA [phone]         [address]



                                                MEDICAL                 



                                                OFFICE                  



                                                [facility]                 

 

        2021 Letter          ROSHNI Elias    1.2.840.114 [id number]

[id number] Univers



        00:00:00 00:00:00 (Out)           Kathi LIAO   350.1.13.10         it

y of



                                                HOSPITAL 4.2.7.2.686         [place]

as



                                                        [phone]         [address]

 

        2021 Outpatient SOP[facility]IE [facility]a [facility]    [id number]

[id number] Univers



        12:00:00 12:18:19                 MINISTERIO oliveira [facility]

 

        2021 [facility]gent          [facility]    1.2.840.114 [id number]

[id number] Univers



        11:42:31 12:02:31 Care            Ministerio  HEALTH  350.1.13.10         it

y of



                                                [place] 4.2.7.2.686         [place]

as



                                                LEO?BLEA [phone]         [address]                 



                                                OFFICE                  



                                                [facility]                 

 

        2021 Outpatient SOPHIE BRYAN   [facility]    [id number]

[id number] Univers



        12:00:00 12:00:00                 MINISTERIO oliveira [facility]

 

        2021-10-11 2021-10-11 Urgent          [facility]    .2.840.114 [id number]

[id number] Univers



        14:59:38 15:39:45 Care            [facility]  350.1.13.10 

        ity of



                                                [place] 4.2.7.2.686         [place]

as



                                                Leo?Blea [phone]         [address]                 



                                                Office                  



                                                [facility]                 

 

        2021-10-11 2021-10-11 Outpatient R       JUAN DIEGO [facility]    [id number]

[id number] Univers



        15:00:00 15:00:00                 LAURENCE oliveira o

f



                                                                        [facility]

 

        2021-10-11 2021-10-11 Letter          Provider, [facility]    1.2.863.160 [id number]

[id number] Univers



        00:00:00 00:00:00 (Out)           Sai Scott  350.1.13.10         it

y of



                                        [facility] [place] 4.2.7.2.686        

 [place]



                                                Leo?Blea [phone]         Me

dical



                                                [facility]    [address]             [place]



                                                Medical                 



                                                Office                  



                                                Building                 

 

        2021 Patient         Mike  [facility]    1.2.840.114 [id number]

[id number] Univers



        00:00:00 00:00:00 Outreach         Jay Jay  PRIMARY 350.1.13.10         i

ty of



                                        [facility]    4.2.7.2.686         [place]

[place]



                                                [place] [phone]         Me

dical



                                                        [address]             [place]

 

        2021-01-15 2021-01-15 Technician         2, Adc Lab [facility]    1.2.840.114 

[id number] Univers



        16:06:30 16:21:30 Visit           Mehdi Louis 350.1.13.10 

        ity of



                                                [place] 4.2.7.2.686         [place]

[place]



                                                Pro[place] [phone]         Me

dical



                                                nal     353             [facility]                 

 

        2021-01-15 2021-01-15 Office          [facility]    1.2.840.114 [id number]

[id number] Univers



        14:36:28 16:04:23 Visit           Mehdi De Jesus 350.1.13.10         i

ty of



                                                [place] 4.2.7.2.686         [place]

[place]



                                                Profess[place] [phone]         Me

dical



                                                nal     044             [facility]                 

 

        2021-01-15 2021-01-15 Outpatient R       HERIBERTO [facility]    [id number]

[id number] Univers



        14:40:00 14:40:00                 MEHDI oliveira [facility]

 

        2021 Outpatient R       HERIBERTO [facility]    [id number]

[id number] Univers



        16:00:00 16:00:00                 MEHDI oliveiar [facility]

 

        2020 Laboratory         Lab, Adc Fam Pob I [facility]    1.2.

840.114 [id number] 

Univers



        16:50:15 17:10:15 Only            Pinky Lee  350.1.13.10    

     ity of



                                                [place] 4.2.7.2.686         [place]

as



                                                Professio [phone]         Me

dical



                                                nal     [address]             [place]



                                                Office                  



                                                Building                 



                                                One                     

 

        2020 Outpatient R       ESTEFANY  [facility]    [id number]

162 Univers



        17:00:00 17:00:00                 PINKY oliveira [facility]

 

        2020 Laboratory         Lab, [facility] Fam Pob I [facility]    1.2.

840.114 [id number] 

Univers



        14:58:01 15:18:01 Only            [facility]  350.1.13.10    

     ity of



                                                [place] 4.2.7.2.686         [place]

as



                                                Professio [phone]         [facility]     [address]             [address]



                                                Office                  



                                                Building                 



                                                One                     

 

        2020 Outpatient R       ESTEFANY  [facility]    [id number]

[id number] Univers



        15:00:00 15:00:00                 PINKY oliveira [facility]

 

        2020 Outpatient R       ESTEFANY  [facility]    [id number]

368 Univers



        13:00:00 13:00:00                 PINKY oliveira [facility]

 

        2020 Outpatient R       SHANNONGRISEL [facility]    [id number]

[id number] Univers



        07:20:00 07:20:00                 MEHDI                           roxanne [facility]

 

        2020 Telephone         ROSHNI Lee    1.2.525.526 [id number]

[id number] Univers



        00:00:00 00:00:00                 Pinky LIAO   350.1.13.10         it

y of



                                                HOSPITAL 4.2.7.2.686         [place]

as



                                                        [phone]         [address]

 

        2020 Telephone         Les Memecindy BARAKAT    1.2.840.114 

[id number] Univers



        00:00:00 00:00:00                         KESHAWN   350.1.13.10         it

y of



                                                HOSPITAL 4.2.7.2.686         [place]

as



                                                        [phone]         [address]

 

        2020-07-10 2020-07-10 Laboratory         Lab, Adc Fam Pob I [facility]    1.2.

840.114 [id number] 

Univers



        13:22:49 13:42:49 Only            P[facility]  350.1.13.10    

     ity of



                                                [place] 4.2.7.2.686         [place]

as



                                                Professio [phone]         [address]



                                                Office                  



                                                [facility]                 



                                                One                     

 

        2020-07-10 2020-07-10 Outpatient R       ESTEFANY  [facility]    [id number]

507 Univers



        13:20:00 13:20:00                 PINKY oliveira [facility]

 

        2020 Outpatient R       MICHAEL [facility]    [id number]

[id number] Univers



        09:15:00 09:15:00                 JEANMARIE                           roxanne [facility]

 

        2020 Outpatient R       HERIBERTOAdena [facility]    [id number]

[id number] Univers



        08:20:00 08:20:00                 MEHDI                           roxanne [facility]

 

        2020 Telephone         ROSHNI Lee    1.2.122.875 [id number]

[id number] Univers



        00:00:00 00:00:00                 Pinky LIAO   350.1.13.10         it

y of



                                                [facility] 4.2.7.2.686         [place]

as



                                                        [phone]         [address]

 

        2020 Urgent          Pob1, [facility]    1.

2.840.114 [id number] 

Univers



        15:38:04 15:58:04 Care            Mehdi Louis  350.1.13.10  

       ity of



                                                [place] 4.2.7.2.686         [place]

as



                                                Professio [phone]         [address]



                                                Office                  



                                                Building                 



                                                One                     

 

        2020 Outpatient R       HERIBERTO [facility]    1026

[id number] Univers



        07:40:00 07:40:00                 MEHDI oliveira [facility]

 

        2020 [facility]medici         [facility]    1.2.840.114 

[id number] Univers



        07:07:26 07:27:26 ne Visit         Mehdi De Jesus 350.1.13.10         

ity of



                                                [place] 4[place].7.2.686         [place]

[place]



                                                [place] [phone]         [address]                 

 

        2020 [facility]          [facility]    1.2.840.114 [id number]

[id number] Univers



        00:00:00 00:00:00                 Mehdi De Jesus 350.1.13.10         i

ty of



                                                [place] 4[place].7.2.686         [place]

[place]



                                                [place] [phone]         [address]                 

 

        2020 Telemedici         [facility]    1.2.840.114 

[id number] Univers



        07:46:24 08:06:24 ne Visit         Mehdi De Jesus 350.1.13.10         

ity of



                                                [place] 4.2.7.2.686         [place]

[place]



                                                [place] [phone]         [address]                 

 

        [facility]

[id number] Univers



        08:00:00 08:00:00                 MEHDI oliveira [facility]

 

        2020 [facility] R       [facility]    1026

[id number] Univers



        07:20:00 07:20:00                 BRIAN oliveira 

[facility]

 

        2020 Refill          [facility]    1.2.840.114 748

[id number] Univers



        00:00:00 00:00:00                 Mehdi   [place] 350.1.13.10         i

ty of



                                                [place] 4[place]2.7.2.686         [place]

[place]



                                                [place] [phone]         [address]                 

 

        2020 Office          [facility]    1.2.840.114 748

[id number] Univers



        08:02:55 09:13:39 Visit           Mehdi Marteton 350.1.13.10         i

ty of



                                                [place] 4[place]2.7.2.686         [place]

[place]



                                                [place] [phone]         [address]                 

 

        2020 [facility] R       [facility]    1026

[id number] Univers



        08:00:00 08:00:00                 MEHDI oliveira [facility]

 

        2020 Orders          Doctor BARAKAT    1.2.840.114 [id number]

[id number] Univers



        00:00:00 00:00:00 Only            Unassigned, KESHAWN   350.1.13.10       

  ity of



                                        [facility] 4.2.7.2.686         [place]

as



                                                        [phone]         [address]

 

        2020 Letter          [facility]    1.2.840.114 [id number]

[id number] Univers



        00:00:00 00:00:00 (Out)           Mehdi   Tom 350.1.13.10         i

ty 



                                                [place] 4[place]2.7.2.686         [place]

[place]



                                                [place] [phone]         Me

dical



                                                nal     [address]



                                                Building                 

 

        2019 Outpatient R       JANNET [facility]    [id number]

[id number] Univers



        09:00:00 09:20:54                 RENÉE                         ity o

f



                                                                        [facility]

 

        2019 Inpatient P       JENNIFER SANDY [facility]    MANUEL   

  [id number] Univers



        07:06:00 15:22:00                 SANDY RODRIGUEZ                     

    ity [facility]

 

        1900 Orders          Doctor  ROSHNI    1.2.840.114 [id number]

[id number] Univers



        00:00:00 00:00:00 Only            Unassigned, KESHAWN   350.1.13.10       

  ity of



                                        [facility] 4.2.7.2.686         [place]

as



                                                        [phone]         [address]







Results







           Test Description Test Time  Test Comments Results    Result Comments 

Source









                    POCT MOLECULAR FLU  2023 17:10:19 









                      Test Item  Value      Reference Range Interpretation Comme

nts









             POCT Molecular FluA (test code = 88523-3) Negative     Negative    

              

 

             POCT Molecular FluB (test code = 05997-2) Negative     Negative    

              

 

             Lab Interpretation (test code = 09804-4) Normal                    

             



[facility]





             Test Item    Value        Reference    Interpretation Comments



                                       Range                     

 

             TROPONIN I (test 0.000 ng/mL  See_Comment                [Automated



             code = 0054677887)                                        message] 

The



                                                                 system which



                                                                 generated this



                                                                 result



                                                                 transmitted



                                                                 reference range

:



                                                                 <=0.034. The



                                                                 reference range



                                                                 was not used to



                                                                 interpret this



                                                                 result as



                                                                 normal/abnormal

.

 

             GERSON (test code = Reference (Normal)                           



             GERSON)         Range (defined by                           



                          the 99th percentile                           



                          reference limit): <=                           



                          0.034 ng/mL Note:                           



                          Cardiac troponin                           



                          begins to rise 3-4                           



                          hours after the                           



                          onset of ischemia.                           



                          Repeat in 4-6 hours                           



                          if the sample was                           



                          drawn within 3-4                           



                          hours of the onset                           



                          of the symptom and                           



                          found normal.                           



                          Diagnosis of                           



                          myocardial injury is                           



                          made with acute                           



                          changes in cTn                           



                          concentrations with                           



                          at least one serial                           



                          sample above the                           



                          99th percentile                           



                          upper reference                           



                          limit (URL), taken                           



                          together with the                           



                          patient's clinical                           



                          presentation. Biotin                           



                          has been reported to                           



                          cause a negative                           



                          bias, interpret                           



                          results relative to                           



                          patient's use of                           



                          biotin.                                

 

             Lab Interpretation Normal                                 



             (test code =                                        



             90700-7)                                            



[id number] 18:51:49





             Test Item    Value        Reference Range Interpretation Comments

 

             NT-proBNP (test code 47 pg/mL     See_Comment                [Autom

ated



             = 0649634801)                                        message] The



                                                                 system which



                                                                 generated this



                                                                 result



                                                                 transmitted



                                                                 reference range

:



                                                                 <=125. The



                                                                 reference range



                                                                 was not used to



                                                                 interpret this



                                                                 result as



                                                                 normal/abnormal

.

 

             GERSON (test code = GERSON) Biotin has been                           



                          reported to                            



                          cause a negative                           



                          bias, interpret                           



                          results relative                           



                          to patient's use                           



                          of biotin.                             

 

             Lab Interpretation Normal                                 



             (test code = 87234-3)                                        



[facility] (76610)2022 
18:25:25





             Test Item    Value        Reference Range Interpretation Comments

 

             NA (test code = 141 mmol/L   135-145                   



             9836126034)                                         

 

             K (test code = 4.1 mmol/L   3.5-5.0                   



             5858652154)                                         

 

             CL (test code = 106 mmol/L                       



             6213022327)                                         

 

             CO2 TOTAL (test code = 22 mmol/L    23-31        L            



             0345848837)                                         

 

             AGAP (test code =              2-16                      



             5539748206)                                         

 

             BUN (test code = 10 mg/dL     7-23                      



             8739823057)                                         

 

             GLUCOSE (test code = 113 mg/dL           H            



             8530866887)                                         

 

             CREATININE (test code = 0.58 mg/dL   0.50-1.04                 



             0950538153)                                         

 

             TOTAL BILI (test code = 0.7 mg/dL    0.1-1.1                   



             2544756704)                                         

 

             CALCIUM (test code = 9.2 mg/dL    8.6-10.6                  



             7450055250)                                         

 

             T PROTEIN (test code = 7.9 g/dL     6.3-8.2                   



             6483420996)                                         

 

             ALBUMIN (test code = 4.9 g/dL     3.5-5.0                   



             0470243727)                                         

 

             ALK PHOS (test code = 82 U/L                           



             4087716548)                                         

 

             ALTv (test code = 16 U/L       535                      



             1742-6)                                             

 

             AST(SGOT) (test code = 23 U/L       13-40                     



             8614377286)                                         

 

             eGFR (test code =              mL/min/1.73m2              



             2345558522)                                         

 

             GERSON (test code = GERSON) Association of                           



                          Glomerular Filtration                           



                          Rate (GFR) and Staging                           



                          of Kidney Disease*                           



                          +---------------------                           



                          --+-------------------                           



                          --+-------------------                           



                          ------+| GFR                           



                          (mL/min/1.73 m2) ?|                           



                          With Kidney Damage ?|                           



                          ?Without Kidney                           



                          Damage+---------------                           



                          --------+-------------                           



                          --------+-------------                           



                          ------------+| ?>90 ?                           



                          ? ? ? ? ? ? ? ?|                           



                          ?Stage one ? ? ? ? ?|                           



                          ? Normal ? ? ? ? ? ? ?                           



                          ?+--------------------                           



                          ---+------------------                           



                          ---+------------------                           



                          -------+| ?60-89 ? ? ?                           



                          ? ? ? ? ?| ?Stage two                           



                          ? ? ? ? ?| ? Decreased                           



                          GFR ? ? ? ?                            



                          +---------------------                           



                          --+-------------------                           



                          --+-------------------                           



                          ------+| ?30-59 ? ? ?                           



                          ? ? ? ? ?| ?Stage                           



                          three ? ? ? ?| ? Stage                           



                          three ? ? ? ? ?                           



                          +---------------------                           



                          --+-------------------                           



                          --+-------------------                           



                          ------+| ?15-29 ? ? ?                           



                          ? ? ? ? ?| ?Stage four                           



                          ? ? ? ? | ? Stage four                           



                          ? ? ? ? ?                              



                          ?+--------------------                           



                          ---+------------------                           



                          ---+------------------                           



                          -------+| ?<15 (or                           



                          dialysis) ? ?| ?Stage                           



                          five ? ? ? ? | ? Stage                           



                          five ? ? ? ? ?                           



                          ?+--------------------                           



                          ---+------------------                           



                          ---+------------------                           



                          -------+ *Each stage                           



                          assumes the associated                           



                          GFR level has been in                           



                          effect for at least                           



                          three months. ?Stages                           



                          1 to 5, with or                           



                          without kidney                           



                          disease, indicate                           



                          chronic kidney                           



                          disease. Notes:                           



                          Determination of                           



                          stages one and two                           



                          (with eGFR                             



                          >59mL/min/1.73 m2)                           



                          requires estimation of                           



                          kidney damage for at                           



                          least three months as                           



                          defined by structural                           



                          or functional                           



                          abnormalities of the                           



                          kidney, manifested by                           



                          either:Pathological                           



                          abnormalities or                           



                          Markers of kidney                           



                          damage (including                           



                          abnormalities in the                           



                          composition of the                           



                          blood or urine or                           



                          abnormalities in                           



                          imaging tests).                           

 

             Lab Interpretation Abnormal                               



             (test code = 56971-8)                                        



[facility]25





             Test Item    Value        Reference Range Interpretation Comments

 

             LIPASE (test code = 1450730990) 36 U/L       0-220                 

    

 

             Lab Interpretation (test code = Normal                             

    



             76425-4)                                            



[facility] [id number] 18:22:48





             Test Item    Value        Reference Range Interpretation Comments

 

             WBC (test code =              See_Comment                [Automated



             3590-2)                                             message] The sy

stem



                                                                 which generated



                                                                 this result



                                                                 transmitted



                                                                 reference range

:



                                                                 4.30 - 11.10



                                                                 10*3/?L. The



                                                                 reference range

 was



                                                                 not used to



                                                                 interpret this



                                                                 result as



                                                                 normal/abnormal

.

 

             RBC (test code =              See_Comment                [Automated



             789-8)                                              message] The sy

stem



                                                                 which generated



                                                                 this result



                                                                 transmitted



                                                                 reference range

:



                                                                 3.93 - 5.25



                                                                 10*6/?L. The



                                                                 reference range

 was



                                                                 not used to



                                                                 interpret this



                                                                 result as



                                                                 normal/abnormal

.

 

             HGB (test code = 15.2 g/dL    11.6-15.0    H            



             718-7)                                              

 

             HCT (test code = 43.4 %       35.7-45.2                 



             4544-3)                                             

 

             MCV (test code = 94.6 fL      80.6-95.5                 



             787-2)                                              

 

             MCH (test code = 33.1 pg      25.9-32.8    H            



             785-6)                                              

 

             MCHC (test code = 35.0 g/dL    31.6-35.1                 



             786-4)                                              

 

             RDW-SD (test code = 43.4 fL      39.0-49.9                 



             38684-5)                                            

 

             RDW-CV (test code = 12.4 %       12.0-15.5                 



             788-0)                                              

 

             PLT (test code =              See_Comment                [Automated



             777-3)                                              message] The sy

stem



                                                                 which generated



                                                                 this result



                                                                 transmitted



                                                                 reference range

:



                                                                 166 - 358 10*3/

?L.



                                                                 The reference r

lorenza



                                                                 was not used to



                                                                 interpret this



                                                                 result as



                                                                 normal/abnormal

.

 

             MPV (test code = 9.5 fL       9.5-12.9                  



             42482-1)                                            

 

             NRBC/100 WBC (test              See_Comment                [Automat

ed



             code = 1815410467)                                        message] 

The system



                                                                 which generated



                                                                 this result



                                                                 transmitted



                                                                 reference range

:



                                                                 0.0 - 10.0 /100



                                                                 WBCs. The refer

ence



                                                                 range was not u

sed



                                                                 to interpret th

is



                                                                 result as



                                                                 normal/abnormal

.

 

             NRBC x10^3 (test code <0.01        See_Comment                [Auto

mated



             = [id number])                                        message] The s

ystem



                                                                 which generated



                                                                 this result



                                                                 transmitted



                                                                 reference range

:



                                                                 10*3/?L. The



                                                                 reference range

 was



                                                                 not used to



                                                                 interpret this



                                                                 result as



                                                                 normal/abnormal

.

 

             GRAN MAT (NEUT) % 88.0 %                                 



             (test code = 770-8)                                        

 

             IMM GRAN % (test code 0.30 %                                 



             = [id number])                                        

 

             LYMPH % (test code = 8.2 %                                  



             736-9)                                              

 

             MONO % (test code = 3.2 %                                  



             5905-5)                                             

 

             EOS % (test code = 0.2 %                                  



             713-8)                                              

 

             BASO % (test code = 0.1 %                                  



             706-2)                                              

 

             GRAN MAT x10^3(ANC) 8.46 10*3/uL 1.88-7.09    H            



             (test code =                                        



             5689045347)                                         

 

             IMM GRAN x10^3 (test 0.03 10*3/uL 0.00-0.06                 



             code = 4153472074)                                        

 

             LYMPH x10^3 (test code 0.79 10*3/uL 1.32-3.29    L            



             = 731-0)                                            

 

             MONO x10^3 (test code 0.31 10*3/uL 0.33-0.92    L            



             = 742-7)                                            

 

             EOS x10^3 (test code = <0.03        0.03-0.39    L            



             711-2)                                              

 

             BASO x10^3 (test code <0.03        0.01-0.07                 



             = 704-7)                                            

 

             Lab Interpretation Abnormal                               



             (test code = 38469-9)                                        



[id number]





             Test Item    Value        Reference Range Interpretation Comments

 

             POCT PREG (test code = 1605) Negative                              

 

 

             On board controls acceptable with Present                          

      



             C Line (test code = 3574)                                        

 

             POCT PREG LOT # (test code = 3575) [id number]                      

       

 

             POCT PREG TEST  DATE (test 2023                        

     



             code = 3576)                                        

 

             Lab Interpretation (test code = Normal                             

    



             55397-8)                                            



[id number]05





             Test Item    Value        Reference Range Interpretation Comments

 

             POCT Molecular FluA (test code = Negative     Negative             

     



             77957-6)                                            

 

             POCT Molecular FluB (test code = Negative     Negative             

     



             31159-6)                                            

 

             Lab Interpretation (test code = Normal                             

    



             63439-7)                                            



[facility]

## 2023-05-09 NOTE — ER
Nurse's Notes                                                                                     

 [facility]                                                                 

Name: Jenna Vallejo                                                                                  

Age: 37 yrs                                                                                       

Sex: Female                                                                                       

: 1985                                                                                   

MRN: [id number]                                                                                   

Arrival Date: 2023                                                                          

Time: 10:56                                                                                       

Account#: [id number]                                                                            

Bed 17                                                                                            

Private MD:                                                                                       

Diagnosis: Other seizures;Contusion of front wall of thorax;UTI/ Urinary tract infection, site not

  specified                                                                                       

                                                                                                  

Presentation:                                                                                     

                                                                                             

11:00 Chief complaint: EMS states: Was at work in break room alone, co-workers heard a thud   ph  

      then found pt on the ground having seizure like activity, foaming at the mouth, lasted      

      approx 5 minutes, pt post-ictal on scene A\T\O x 2, now A\T\O x 4, en route to ED pt began  

      c/o chest pressure 5/10, 12 lead NSR, VSS. Coronavirus screen: Vaccine status: Patient      

      reports being unvaccinated. Ebola Screen: No symptoms or risks identified at this time.     

      Initial Sepsis Screen: Does the patient meet any 2 criteria? No. Patient's initial          

      sepsis screen is negative. Does the patient have a suspected source of infection? No.       

      Patient's initial sepsis screen is negative. Risk Assessment: Do you want to hurt           

      yourself or someone else? Patient reports no desire to harm self or others. Onset of        

      symptoms was May 09, 2023.                                                                  

11:00 Method Of Arrival: EMS: [facility]                                                         

11:00 Acuity: STAN 2                                                                           ph  

                                                                                                  

Triage Assessment:                                                                                

11:03 General: Appears in no apparent distress. comfortable, well groomed, Behavior is calm,  ph  

      cooperative, appropriate for age. Pain: Complains of pain in chest. Neuro: Level of         

      Consciousness is awake, obeys commands, Oriented to person, place, time, situation,         

      Seizure activity reported prior to arrival. Patient is post-ictal at this time.             

      Cardiovascular: Reports chest pain, nausea, Capillary refill < 3 seconds in bilateral       

      fingers Patient's skin is warm and dry. Respiratory: Airway is patent Respiratory           

      effort is even, unlabored. GI: Reports nausea. Derm: Skin is pink, warm \T\ dry.            

      Musculoskeletal: Circulation, motion, and sensation intact. Range of motion: intact in      

      all extremities.                                                                            

                                                                                                  

OB/GYN:                                                                                           

11:05 LMP 2023                                                                           ph  

                                                                                                  

Historical:                                                                                       

- Allergies:                                                                                      

11:03 No Known Allergies;                                                                     ph  

- PMHx:                                                                                           

11:03 None;                                                                                   ph  

                                                                                                  

- Immunization history:: Adult Immunizations unknown.                                             

- Social history:: Smoking status: Patient reports the use of cigarette tobacco                   

  products, denies chronic smoking, but will smoke occasionally, cigars, Patient uses             

  alcohol, occasionally. Patient/guardian denies using street drugs.                              

                                                                                                  

                                                                                                  

Screenin:04 [facility] ED Fall Risk Assessment (Adult) History of falling in the last 3 months,       ph  

      including since admission Yes- physiologic fall (2 pts) Confusion or Disorientation No      

      (0 pts) Intoxicated or Sedated No (0 pts) Impaired Gait No (0 pts) Mobility Assist          

      Device Used No (0 pt) Altered Elimination No (0 pt) Score/Fall Risk Level 0 - 2 = Low       

      Risk Oriented to surroundings, Maintained a safe environment, Hourly rounding (assess       

      needs \T\ fall precautionary measures) done. Abuse screen: Denies threats or abuse.         

      Denies injuries from another. Nutritional screening: No deficits noted. Tuberculosis        

      screening: No symptoms or risk factors identified.                                          

                                                                                                  

Assessment:                                                                                       

11:30 General: SEE TRIAGE ASSESSMENT.                                                         ph  

12:38 Reassessment: Patient appears in no apparent distress at this time. Patient and/or      ph  

      family updated on plan of care and expected duration. Pain level reassessed. Patient is     

      alert, oriented x 3, equal unlabored respirations, skin warm/dry/pink.                      

                                                                                                  

Vital Signs:                                                                                      

11:00  / 82; Pulse 86; Resp 18; Temp 97.7; Pulse Ox 100% on R/A; Weight 58.97 kg;       ph  

      Height 5 ft. 4 in. ; Pain 5/10;                                                             

12:09  / 79; Pulse 73; Resp 18; Pulse Ox 98% on R/A;                                    ph  

13:00  / 78; Pulse 72; Resp 16; Pulse Ox 100% on R/A;                                   ph  

14:21  / 72; Pulse 76; Resp 18; Temp 98; Pulse Ox 99% on R/A;                           ph  

11:00 Body Mass Index 22.31 (58.97 kg, 162.56 cm)                                             ph  

11:00 Pain Scale: Adult                                                                       ph  

                                                                                                  

Michele Coma Score:                                                                               

11:03 Eye Response: spontaneous(4). Motor Response: obeys commands(6). Verbal Response:       ph  

      oriented(5). Total: 15.                                                                     

                                                                                                  

ED Course:                                                                                        

11:00 Patient arrived in ED.                                                                  ph  

11:01 Fern Villanueva FNP is [facility].                                                          jh7 

11:01 Devang Saldana DO is Attending Physician.                                                jh7 

11:03 Triage completed.                                                                       ph  

11:04 Arm band placed on.                                                                     ph  

11:08 Etelvina Ramos, RN is Primary Nurse.                                                    ph  

11:16 CT Head C Spine In Process Unspecified.                                                 EDMS

11:20 Initial lab(s) drawn, by me, sent to lab.                                               aa5 

12:37 Patient has correct armband on for positive identification. Bed in low position. Call   ph  

      light in reach. Side rails up X 1. Pulse ox on. NIBP on. Door closed. Noise minimized.      

      Warm blanket given.                                                                         

12:37 No provider procedures requiring assistance completed.                                  ph  

12:38 Seizure precautions initiated.                                                          ph  

13:30 XRAY Chest (1 view) In Process Unspecified.                                             EDMS

14:21 IV discontinued, intact, bleeding controlled, No redness/swelling at site. Pressure     ph  

      dressing applied.                                                                           

                                                                                                  

Administered Medications:                                                                         

11:45 Drug: Ondansetron IVP 4 mg Route: IVP; Site: left hand;                                 ph  

12:38 Follow up: Response: No adverse reaction                                                ph  

12:19 Drug: Keppra IV 1000 mg Route: IV; Rate: 1 calculated rate; Site: left hand;            ph  

12:38 Follow up: Response: No adverse reaction; IV Status: Completed infusion                 ph  

12:19 Drug: Acetaminophen PO 1000 mg Route: PO;                                               ph  

12:38 Follow up: Response: No adverse reaction                                                ph  

13:58 Drug: Rocephin IV 1 grams Route: IV; Rate: 1 calculated rate; Site: left hand;          ph  

14:20 Follow up: Response: No adverse reaction; IV Status: Completed infusion                 ph  

                                                                                                  

                                                                                                  

Medication:                                                                                       

11:05 VIS not applicable for this client.                                                     ph  

                                                                                                  

Outcome:                                                                                          

13:46 Discharge ordered by MD.                                                                [facility] 

14:21 Discharged to home ambulatory, with family.                                             ph  

14:21 Condition: good                                                                             

14:21 Discharge instructions given to patient, Instructed on discharge instructions, follow       

      up and referral plans. medication usage, Demonstrated understanding of instructions,        

      follow-up care, medications, Prescriptions given X 1.                                       

14:22 Patient left the ED.                                                                    ph  

                                                                                                  

Signatures:                                                                                       

Dispatcher [employer]                           EDMS                                                 

Sissy Trimble, KIARA CRAIG   aa                                                  

Etelvina Ramos, KIARA                      RN                                                      

Fern Villanueva, JOSEP                   [place] [id number]                                                  

                                                                                                  

**************************************************************************************************

## 2023-05-09 NOTE — EDPHYS
Physician Documentation                                                                           

 [facility]                                                                 

Name: Jenna Vallejo                                                                                  

Age: 37 yrs                                                                                       

Sex: Female                                                                                       

: 1985                                                                                   

MRN: [id number]                                                                                   

Arrival Date: 2023                                                                          

Time: 10:56                                                                                       

Account#: [id number]                                                                            

Bed 17                                                                                            

Private MD:                                                                                       

ED Physician Devang Saldana                                                                         

HPI:                                                                                              

                                                                                             

11:03 This 37 yrs old Female presents to ER via EMS with complaints of Probable Seizure.      jh7 

11:03 The patient presents after having a single isolated seizure, that lasted 5 minute(s),   jh7 

      the episode(s) was witnessed, by co-worker(s). Character of seizure(s): Loss of             

      consciousness: the patient experienced loss of consciousness, Motor activity: focal         

      activity. Seizure onset: just prior to arrival. Context: the seizure(s) was witnessed,      

      by co-worker(s), occurred at work, occurred while the patient was standing. Seizure Hx:     

      Last seizure: The patient's last seizure was approximately 1 year(s) ago.                   

11:03 Associated injury: Chest: pain, tenderness, Back: pain.                                 jh7 

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 The patient       jh7 

      states that this is happened 1 other time about 1 year ago. Her coworkers report that       

      they heard a crash and that the patient was on the ground shaking and foaming at the        

      mouth. On scene the patient was ANO x2 and she quickly became ANO x4. She complains of      

      chest soreness after the event and back pain that has occurred for the past several         

      days. No medical problems..                                                                 

                                                                                                  

OB/GYN:                                                                                           

11:05 LMP 2023                                                                           ph  

                                                                                                  

Historical:                                                                                       

- Allergies:                                                                                      

11:03 No Known Allergies;                                                                     ph  

- PMHx:                                                                                           

11:03 None;                                                                                   ph  

                                                                                                  

- Immunization history:: Adult Immunizations unknown.                                             

- Social history:: Smoking status: Patient reports the use of cigarette tobacco                   

  products, denies chronic smoking, but will smoke occasionally, cigars, Patient uses             

  alcohol, occasionally. Patient/guardian denies using street drugs.                              

                                                                                                  

                                                                                                  

ROS:                                                                                              

11:03 Constitutional: Negative for fever, chills, and weight loss, Eyes: Negative for injury, jh7 

      pain, redness, and discharge, Neck: Negative for injury, pain, and swelling,                

      Respiratory: Negative for shortness of breath, cough, wheezing, and pleuritic chest         

      pain, Abdomen/GI: Negative for abdominal pain, nausea, vomiting, diarrhea, and              

      constipation, MS/Extremity: Negative for injury and deformity, Skin: Negative for           

      injury, rash, and discoloration, Neuro: Negative for headache, weakness, numbness,          

      tingling, and seizure.                                                                      

11:03 Cardiovascular: Positive for chest pain, with movement.                                     

11:03 Back: Positive for flank pain, bilaterally.                                                 

11:03 Neuro: Positive for seizure activity, Negative for dizziness, headache, numbness,           

      tingling, visual changes, weakness.                                                         

11:03 All other systems are negative.                                                             

                                                                                                  

Exam:                                                                                             

11:03 Constitutional:  This is a well developed, well nourished patient who is awake, alert,  jh7 

      and in no acute distress. Head/Face:  Normocephalic, atraumatic. Eyes:  Pupils equal        

      round and reactive to light, extra-ocular motions intact.  Lids and lashes normal.          

      Conjunctiva and sclera are non-icteric and not injected.  Cornea within normal limits.      

      Periorbital areas with no swelling, redness, or edema. Neck:  Trachea midline, no           

      thyromegaly or masses palpated, and no cervical lymphadenopathy.  Supple, full range of     

      motion without nuchal rigidity, or vertebral point tenderness.  No Meningismus.             

      Cardiovascular:  Regular rate and rhythm with a normal S1 and S2.  No gallops, murmurs,     

      or rubs.  Normal PMI, no JVD.  No pulse deficits. Respiratory:  Lungs have equal breath     

      sounds bilaterally, clear to auscultation and percussion.  No rales, rhonchi or wheezes     

      noted.  No increased work of breathing, no retractions or nasal flaring. Abdomen/GI:        

      Soft, non-tender, with normal bowel sounds.  No distension or tympany.  No guarding or      

      rebound.  No evidence of tenderness throughout. Skin:  Warm, dry with normal turgor.        

      Normal color with no rashes, no lesions, and no evidence of cellulitis. MS/ Extremity:      

      Pulses equal, no cyanosis.  Neurovascular intact.  Full, normal range of motion. Neuro:     

       Awake and alert, GCS 15, oriented to person, place, time, and situation.  Cranial          

      nerves II-XII grossly intact.  Motor strength 5/5 in all extremities.  Sensory grossly      

      intact.  Cerebellar exam normal.  Normal gait.                                              

11:03 Back: CVA tenderness, that is mild, is noted bilaterally.                                   

                                                                                                  

Vital Signs:                                                                                      

11:00  / 82; Pulse 86; Resp 18; Temp 97.7; Pulse Ox 100% on R/A; Weight 58.97 kg;       ph  

      Height 5 ft. 4 in. ; Pain 5/10;                                                             

12:09  / 79; Pulse 73; Resp 18; Pulse Ox 98% on R/A;                                    ph  

13:00  / 78; Pulse 72; Resp 16; Pulse Ox 100% on R/A;                                   ph  

14:21  / 72; Pulse 76; Resp 18; Temp 98; Pulse Ox 99% on R/A;                           ph  

11:00 Body Mass Index 22.31 (58.97 kg, 162.56 cm)                                             ph  

11:00 Pain Scale: Adult                                                                       ph  

                                                                                                  

Michele Coma Score:                                                                               

11:03 Eye Response: spontaneous(4). Motor Response: obeys commands(6). Verbal Response:       ph  

      oriented(5). Total: 15.                                                                     

                                                                                                  

MDM:                                                                                              

11:02 Patient medically screened.                                                             [facility] 

13:55 Differential diagnosis: cerebral vascular accident, drug overdose, seizure. Data        [facility] 

      reviewed: vital signs, nurses notes, lab test result(s), EKG, radiologic studies, CT        

      scan. I considered the following discharge prescriptions or medication management in        

      the emergency department Medications were administered in the Emergency Department. See     

      MAR. Independent interpretation of the following test(s) in the Emergency Department        

      EKG: See my EKG interpretation above X-Ray: My interpretation is no acute findingd.         

      Counseling: I had a detailed discussion with the patient and/or guardian regarding: the     

      historical points, exam findings, and any diagnostic results supporting the                 

      discharge/admit diagnosis, lab results, radiology results, the need for outpatient          

      follow up, a neurologist, to return to the emergency department if symptoms worsen or       

      persist or if there are any questions or concerns that arise at home. Response to           

      treatment: the patient's symptoms have markedly improved after treatment. Special           

      discussion: Patient denied burning with urination but states that she had flank pain        

      for the past 3 days with no injury. Agreed to treat for UTI. Strongly encouraged the        

      patient to follow-up with Dr. Vázquez with neurology within the next few days..            

                                                                                                  

                                                                                             

11:02 Order name: Acetaminophen; Complete Time: 12:11                                         [facility] 

                                                                                             

11:02 Order name: Basic Metabolic Panel; Complete Time: 12:11                                 [facility] 

                                                                                             

11:02 Order name: CBC with Diff; Complete Time: 12:11                                         [facility] 

                                                                                             

11:02 Order name: ETOH Level; Complete Time: 12:11                                            [facility] 

                                                                                             

11:02 Order name: Hepatic Function; Complete Time: 12:11                                      [facility] 

                                                                                             

11:02 Order name: PT-INR; Complete Time: 12:11                                                [facility] 

                                                                                             

11:02 Order name: Pregnancy Test, Urine; Complete Time: 12:49                                 [facility] 

                                                                                             

11:02 Order name: Ptt, Activated; Complete Time: 12:11                                        [facility] 

                                                                                             

11:02 Order name: Salicylate; Complete Time: 12:20                                            [facility] 

                                                                                             

11:02 Order name: Urinalysis w/ reflexes; Complete Time: 12:49                                [facility] 

                                                                                             

11:02 Order name: Urine Drug Screen; Complete Time: 12:50                                     [facility] 

                                                                                             

11:25 Order name: Troponin High Sensitivity; Complete Time: 12:49                             [facility] 

                                                                                             

11:02 Order name: CT Head C Spine; Complete Time: 11:31                                       [facility] 

                                                                                             

12:53 Order name: XRAY Chest (1 view); Complete Time: 13:45                                   [facility] 

                                                                                             

11:02 Order name: EKG; Complete Time: 11:03                                                   [facility] 

                                                                                             

11:02 Order name: EKG - Nurse/Tech; Complete Time: 11:47                                      [facility] 

                                                                                             

11:02 Order name: IV Saline Lock; Complete Time: 11:07                                        [facility] 

                                                                                             

11:02 Order name: Labs collected and sent; Complete Time: 11:47                               [facility] 

                                                                                                  

EC:34 Rate is 77 beats/min. Rhythm is regular. QRS Axis is Normal. NE interval is normal at   [facility] 

      154 msec. QT interval is normal at 406 msec. No Q waves. Clinical impression: NSR w/        

      Non-specific ST/T Changes.                                                                  

                                                                                                  

Administered Medications:                                                                         

11:45 Drug: Ondansetron IVP 4 mg Route: IVP; Site: left hand;                                 ph  

12:38 Follow up: Response: No adverse reaction                                                ph  

12:19 Drug: Keppra IV 1000 mg Route: IV; Rate: 1 calculated rate; Site: left hand;            ph  

12:38 Follow up: Response: No adverse reaction; IV Status: Completed infusion                 ph  

12:19 Drug: Acetaminophen PO 1000 mg Route: PO;                                               ph  

12:38 Follow up: Response: No adverse reaction                                                ph  

13:58 Drug: Rocephin IV 1 grams Route: IV; Rate: 1 calculated rate; Site: left hand;          ph  

14:20 Follow up: Response: No adverse reaction; IV Status: Completed infusion                 ph  

                                                                                                  

                                                                                                  

Disposition:                                                                                      

12:07 Co-signature as Attending Physician, Devang Saldana DO I was immediately available on-site ms3 

      in the Emergency Department for consultation in the care of the patient.                    

                                                                                                  

Disposition Summary:                                                                              

23 13:46                                                                                    

Discharge Ordered                                                                                 

      Location: Home                                                                          jh7 

      Problem: new                                                                            [facility] 

      Symptoms: have improved                                                                 [facility] 

      Condition: Stable                                                                       [facility] 

      Diagnosis                                                                                   

        - Other seizures                                                                      7 

        - Contusion of front wall of thorax                                                   jh7 

        - UTI/ Urinary tract infection, site not specified                                    [facility] 

      Followup:                                                                               [facility] 

        - With: Private Physician                                                                  

        - When: 2 - 3 days                                                                         

        - Reason: Recheck today's complaints                                                       

      Discharge Instructions:                                                                     

        - Discharge Summary Sheet                                                             7 

        - Seizure, Adult                                                                      jh7 

        - Urinary Tract Infection, Adult                                                      jh7 

        - Chest Contusion, Adult, Easy-to-Read                                                7 

        - Seizure, Adult, Easy-to-Read                                                        [facility] 

      Forms:                                                                                      

        - Work release form                                                                   ph  

        - Family Work Release                                                                 ph  

        - Medication Reconciliation Form                                                      [facility] 

        - Thank You Letter                                                                    [facility] 

        - Antibiotic Education                                                                [facility] 

      Prescriptions:                                                                              

        - Macrobid 100 mg Oral Capsule                                                             

            - take 1 capsule by ORAL route every 12 hours for 7 days; 14 capsule; Refills: 0, jh7 

      Product Selection Permitted                                                                 

Signatures:                                                                                       

Dispatcher [employer]                           Etelvina Hurt, RN                      RN   Devang Núñez DO DO   ms3                                                  

Fern Villanueva, FNP                   FNP  [facility]                                                  

                                                                                                  

**************************************************************************************************

## 2023-05-09 NOTE — RAD REPORT
EXAM DESCRIPTION:  CT - [id number] - 5/9/2023 11:14 am

 

CLINICAL HISTORY:  Trauma, head and neck injury.

SEIZURE

 

COMPARISON:  No comparisons

 

TECHNIQUE:  Axial 5 mm thick images of the head were obtained.

 

Axial 2 mm thick images of the cervical spine were obtained with sagittal and coronal reconstruction 
images generated and reviewed.

 

All CT scans are performed using dose optimization technique as appropriate and may include automated
 exposure control or mA/KV adjustment according to patient size.

 

FINDINGS:  CT HEAD WITHOUT CONTRAST:

 

No acute hemorrhage, hydrocephalus or extra-axial collection is identified.No areas of brain edema or
 midline shift.

 

The paranasal sinuses and mastoids are clear.The calvarium is intact.

 

CT CERVICAL SPINE WITHOUT CONTRAST:

 

No fracture or subluxation.Spondylosis is present lower cervical levels, notable at C5-6.No preverteb
ral soft tissues swelling is identified.

 

IMPRESSION:  No acute intracranial or cervical spine findings.

## 2023-05-09 NOTE — RAD REPORT
EXAM DESCRIPTION:  January Single View5/9/2023 1:28 pm

 

CLINICAL HISTORY:  Chest pain

 

COMPARISON:  none

 

FINDINGS:   The lungs appear clear of acute infiltrate. The heart is normal size

 

IMPRESSION:  No acute abnormalities displayed

## 2023-05-10 NOTE — EKG
Test Date:    2023-05-09               Test Time:    11:30:53

Technician:   PH                                     

                                                     

MEASUREMENT RESULTS:                                       

Intervals:                                           

Rate:         77                                     

AL:           154                                    

QRSD:         86                                     

QT:           406                                    

QTc:          459                                    

Axis:                                                

P:            65                                     

AL:           154                                    

QRS:          82                                     

T:            83                                     

                                                     

INTERPRETIVE STATEMENTS:                                       

                                                     

Normal sinus rhythm

Nonspecific ST abnormality

Abnormal ECG

No previous ECG available for comparison



Electronically Signed On 05-10-23 15:57:33 CDT by Dakota Bello

## 2023-05-26 ENCOUNTER — HOSPITAL ENCOUNTER (EMERGENCY)
Dept: HOSPITAL 97 - ER | Age: 38
Discharge: HOME | End: 2023-05-26
Payer: COMMERCIAL

## 2023-05-26 VITALS — DIASTOLIC BLOOD PRESSURE: 81 MMHG | SYSTOLIC BLOOD PRESSURE: 113 MMHG

## 2023-05-26 VITALS — TEMPERATURE: 98.7 F | OXYGEN SATURATION: 100 %

## 2023-05-26 DIAGNOSIS — R51.9: Primary | ICD-10-CM

## 2023-05-26 DIAGNOSIS — Z98.82: ICD-10-CM

## 2023-05-26 LAB
ALBUMIN SERPL BCP-MCNC: 4 G/DL (ref 3.4–5)
ALP SERPL-CCNC: 67 U/L (ref 45–117)
ALT SERPL W P-5'-P-CCNC: 20 U/L (ref 13–56)
AST SERPL W P-5'-P-CCNC: 12 U/L (ref 15–37)
BILIRUB INDIRECT SERPL-MCNC: (no result) MG/DL (ref 0.2–0.8)
BUN BLD-MCNC: 11 MG/DL (ref 7–18)
GLUCOSE SERPLBLD-MCNC: 97 MG/DL (ref 74–106)
HCT VFR BLD CALC: 38.9 % (ref 36–45)
INR BLD: 1.01
LYMPHOCYTES # SPEC AUTO: 1.2 K/UL (ref 0.7–4.9)
MCV RBC: 95.2 FL (ref 80–100)
METHAMPHET UR QL SCN: NEGATIVE
PMV BLD: 7.8 FL (ref 7.6–11.3)
POTASSIUM SERPL-SCNC: 4.1 MEQ/L (ref 3.5–5.1)
RBC # BLD: 4.08 M/UL (ref 3.86–4.86)
THC SERPL-MCNC: POSITIVE NG/ML

## 2023-05-26 PROCEDURE — 80076 HEPATIC FUNCTION PANEL: CPT

## 2023-05-26 PROCEDURE — 99285 EMERGENCY DEPT VISIT HI MDM: CPT

## 2023-05-26 PROCEDURE — 81025 URINE PREGNANCY TEST: CPT

## 2023-05-26 PROCEDURE — 96375 TX/PRO/DX INJ NEW DRUG ADDON: CPT

## 2023-05-26 PROCEDURE — 36415 COLL VENOUS BLD VENIPUNCTURE: CPT

## 2023-05-26 PROCEDURE — 85025 COMPLETE CBC W/AUTO DIFF WBC: CPT

## 2023-05-26 PROCEDURE — 80048 BASIC METABOLIC PNL TOTAL CA: CPT

## 2023-05-26 PROCEDURE — 80307 DRUG TEST PRSMV CHEM ANLYZR: CPT

## 2023-05-26 PROCEDURE — 85730 THROMBOPLASTIN TIME PARTIAL: CPT

## 2023-05-26 PROCEDURE — 96361 HYDRATE IV INFUSION ADD-ON: CPT

## 2023-05-26 PROCEDURE — 93005 ELECTROCARDIOGRAM TRACING: CPT

## 2023-05-26 PROCEDURE — 96365 THER/PROPH/DIAG IV INF INIT: CPT

## 2023-05-26 PROCEDURE — 70450 CT HEAD/BRAIN W/O DYE: CPT

## 2023-05-26 PROCEDURE — 85610 PROTHROMBIN TIME: CPT

## 2023-05-26 NOTE — XMS REPORT
Continuity of Care Document

                             Created on:May 26, 2023



Patient:MELVI AGUILAR

Sex:Female

:1985

External Reference #:[id number]





Demographics







                          Address                   [address] [place] [address]

 

                          Home Phone                [phone]

 

                          Work Phone                [phone]

 

                          Mobile Phone              [phone]

 

                          Email Address             [email or web address]

 

                          Preferred Language        English

 

                          Marital Status            Unknown

 

                          Zoroastrianism Affiliation     Unknown

 

                          Race                      Unknown

 

                          Additional Race(s)        Unavailable

 

                          Ethnic Group              Unknown









Author







                          Organization              [facility]

t

 

                          Address                   [address]



                                                    [place] [address]

 

                          Phone                     [phone]









Support







                Name            Relationship    Address         Phone

 

                MARYLOU FRIED               [address]    [phone]



                                                [place] [address] 

 

                ZAID AGUILAR               Unavailable     [phone]









Care Team Providers







                    Name                Role                Phone

 

                    BRIAN VENEGAS Primary Care Physician Unavailable

 

                    ANA ESCALONA      Attending Clinician Unavailable

 

                    Ebrahiles FNPAna  Attending Clinician [phone]

 

                    Unknown, Attending  Attending Clinician Unavailable

 

                    Doctor Unassigned, No Name Attending Clinician Unavailable

 

                    Tawana Sanchez RN      Attending Clinician Unavailable

 

                    Only, Ang Db Test   Attending Clinician Unavailable

 

                    Jordyn Joe Attending Clinician [phone]

 

                    JORDYN ADAMSON   Attending Clinician Unavailable

 

                    KAYLAH MENJIVAR    Attending Clinician Unavailable

 

                    Kaylah Orlando Attending Clinician [phone]

 

                    PEREZ LANGSTON        Attending Clinician Unavailable

 

                    Perez Velásquez    Attending Clinician [phone]

 

                    Provider, [facility] Attending Clinician Unavailable

 

                    Kathi Elias RN  Attending Clinician Unavailable

 

                    ABBEY BRYAN        Attending Clinician Unavailable

 

                    Abbey Bryan MD     Attending Clinician [phone]

 

                    Jay Jay Mckeon DO Attending Clinician [phone]

 

                    2, [facility]          Attending Clinician Unavailable

 

                    Bryn ARREOLA, Mehdi Attending Clinician [phone]

 

                    MEHDI LOUIS    Attending Clinician Unavailable

 

                    Lab, [facility]  Attending Clinician Unavailable

 

                    Clifford Koch  Attending Clinician [phone]

 

                    CLIFFORD LEE      Attending Clinician Unavailable

 

                    Meme Saldana RN      Attending Clinician Unavailable

 

                    JEANMARIE RODRIGUEZ Attending Clinician Unavailable

 

                    [facility] Attending Clinician Unavailable

 

                    BRIAN VENEGAS Attending Clinician Unavailable

 

                    RENÉE POWER  Attending Clinician Unavailable

 

                    SANDY RODRIGUEZ Attending Clinician Unavailable

 

                    SANDY RODRIGUEZ Attending Clinician Unavailable

 

                    KAYLAH MENJIVAR    Admitting Clinician Unavailable

 

                    SANDY RODRIGUEZ Admitting Clinician Unavailable









Payers







           Payer Name Policy Type Policy Number Effective Date Expiration Date S

ource







Problems







       Condition Condition Condition Status Onset  Resolution Last   Treating Co

mments 

Source



       Name   Details Category        Date   Date   Treatment Clinician        



                                                 Date                 

 

       Need for Need for Disease Active                              Unive

rs



       vaccinatio vaccinatio               1-19                               it

y of



       n with n with               00:00:                             [place]



       13-polyval 13-polyval               00                                 Me

dical



       ent    ent                                                     Branch



       pneumococc pneumococc                                                  



       al     al                                                      



       conjugate conjugate                                                  



       vaccine vaccine                                                  

 

       Need for Need for Disease Active                              Unive

rs



       vaccinatio vaccinatio               1-19                               it

y of



       n with n with               00:00:                             [place]



       13-polyval 13-polyval               00                                 Me

dical



       ent    ent                                                     Branch



       pneumococc pneumococc                                                  



       al     al                                                      



       conjugate conjugate                                                  



       vaccine vaccine                                                  

 

       ADHD   ADHD   Disease Active                              [facility]



       (attention (attention               1-15                               it

y of



       deficit deficit               00:00:                             [place]



       hyperactiv hyperactiv               00                                 Me

dical



       ity    ity                                                     Branch



       disorder), disorder),                                                  



       combined combined                                                  



       type   type                                                    

 

       Anxiety, Anxiety, Disease Active                              Unive

rs



       generalize generalize               1-15                               it

y of



       d      d                    00:00:                             [place]



                                   00                                 Medical



                                                                      Branch

 

       Stress at Stress at Disease Active                              Uni

vers



       home   home                 1-15                               ity of



                                   00:00:                             [place]



                                   00                                 Medical



                                                                      Branch

 

       Current Current Disease Active                              Univers



       mild   mild                 1-15                               ity of



       episode of episode of               00:00:                             [place]

[place]



       major  major                00                                 Medical



       depressive depressive                                                  Br

anch



       disorder, disorder,                                                  



       unspecifie unspecifie                                                  



       d whether d whether                                                  



       recurrent recurrent                                                  

 

       Adjustment Adjustment Disease Active                              U

nivers



       insomnia insomnia               1-15                               ity of



                                   00:00:                             [place]



                                   00                                 Medical



                                                                      Branch

 

       Bronchitis Bronchitis Disease Active                              U

nivers



                                   4-28                               ity of



                                   00:00:                             [place]



                                   00                                 Medical



                                                                      Branch

 

       Nose   Nose   Disease Active                              [facility]



       congestion congestion               4-28                               it

y of



                                   00:00:                             [place]



                                   00                                 Medical



                                                                      Branch

 

       Exposure Exposure Disease Active                              Unive

rs



       to     to                   4-28                               ity of



       SARS-assoc SARS-assoc               00:00:                             Te

shantells



       iated  iated                00                                 Medical



       coronaviru coronaviru                                                  Br

anch



       s      s                                                       

 

       Acute  Acute  Disease Active 2020-                             Univers



       bacterial bacterial               3-17                               ity 

of



       pharyngiti pharyngiti               00:00:                             [place]

[place]



       s      s                    00                                 Medical



                                                                      Branch

 

       Sore   Sore   Disease Active -                             Univers



       throat throat               3-17                               ity of



                                   00:00:                             [place]



                                   00                                 Medical



                                                                      Branch

 

       Nicotine Nicotine Disease Active -                             Unive

rs



       dependence dependence               3-17                               it

y of



       ,      ,                    00:00:                             [place]



       cigarettes cigarettes               00                                 Me

dical



       ,      ,                                                       Branch



       uncomplica uncomplica                                                  



       julius    julius                                                     

 

       Cough  Cough  Disease Active -                             Univers



                                   3-17                               ity of



                                   00:00:                             [place]



                                   00                                 Medical



                                                                      Branch

 

       Status Status Disease Active 2019-                             [facility]



       post tubal post tubal               4-12                               it

y of



       ligation ligation               00:00:                             [place]



                                   00                                 Medical



                                                                      Branch

 

       Shoulder Shoulder Disease Active                              Unive

rs



       dystocia dystocia               4-12                               ity of



       during during               00:00:                             [place]



       labor and labor and               00                                 Medi

sapna



       delivery delivery                                                  Branch

 

       History of History of Disease Active                              U

nivers



       seizure seizure               4-12                               ity of



                                   00:00:                             [place]



                                   00                                 Medical



                                                                      Branch

 

       H/O    H/O    Disease Active 2019-                             Univers



       maternal maternal               1-24                               ity of



       cardiomyop cardiomyop               00:00:                             Te

lorelei



       athy,  athy,                00                                 Medical



       currently currently                                                  [place]

ch



       pregnant, pregnant,                                                  



       third  third                                                   



       trimester trimester                                                  

 

       History of History of Disease Active 2018                      Overview

: Univers



       echocardio echocardio               1-                        Formattin

 ity of



       gram   gram                 00:00:                      g of this [place]



                                   00                          note   Medical



                                                               might be Branch



                                                               different 



                                                               from the 



                                                               original. 



                                                               See    



                                                               results 



                                                               from   



                                                               2018 in 



                                                               chart  



                                                               review 

 

       Cervical Cervical Disease Active 2018                      Overview: Un

leslie



       Papanicola Papanicola               1-01                        Formattin

 ity of



       ou smear ou smear               00:00:                      g of this [place]

as



       negative negative               00                          note   Medica

l



       within within                                           might be Branch



       last 12 last 12                                           different 



       months months                                           from the 



                                                               original. 



                                                               2018 



                                                               neg pap 



                                                               and neg 



                                                               hpv see 



                                                               scanned 



                                                               records 

 

       Postpartum Postpartum Disease Active 2018                      Overview

: Univers



       cardiomyop cardiomyop               0-29                        [facility]att[place]

 ity of



       athy   athy                 00:00:                      g of this [place]



                                                             note   Medical



                                                               might be Branch



                                                               different 



                                                               from the 



                                                               original. 



                                                               Reported 



                                                               with 2008 



                                                               pregnancy 



                                                               2018 



                                                               normal 



                                                               echo, see 



                                                               scanned 



                                                               records 







Allergies, Adverse Reactions, Alerts







       Allergy Allergy Status Severity Reaction(s) Onset  Inactive Treating Comm

ents 

Source



       Name   Type                        Date   Date   Clinician        

 

       NO KNOWN Drug   Active                                           Univers



       ALLERGIE Class                                                   ity of



       S                                                              [facility]







Social History







           Social Habit Start Date Stop Date  Quantity   Comments   Source

 

           Exposure to 2023 Not sure              [facility]



           SARS-CoV-2 00:00:00   10:37:00                         [place] [facility]



           (event)                                                Branch

 

           Alcohol intake 2022 0 /d                  [facility]



                      00:00:00   00:00:00                         [facility]

 

           Tobacco use and 2020 Smokeless tobacco            Un

iversity of



           exposure   00:00:00   00:00:00   non-user              [facility]

 

           Tobacco Comment 2018-10-29 2018-10-29 d/c as soon as            [facility]

[facility]



                      00:00:00   00:00:00   she found out            [place] [facility]

al



                                            about pregnancy            Branch

 

           History of            2018 Cigarette Smoker            Universi

ty of



           tobacco use            00:00:00                         [facility]

 

           Sex Assigned At 1985                       Universit

y of



           Birth      00:00:00   00:00:00                         [facility]









                Smoking Status  Start Date      Stop Date       Source

 

                Tobacco smoking consumption                                 [facility]

[facility]



                unknown                                         Branch

 

                Occasional tobacco smoker 2020 00:00:00                 Un

iversity of [facility]







Medications







       Ordered Filled Start  Stop   Current Ordering Indication Dosage Frequency

 Signature

                    Comments            Components          Source



     Medication Medication Date Date Medication? Clinician                (SIG) 

          



     Name Name                                                   

 

     amoxicillin      2023- No        [id number] 875mg      Take 1        

   Univers



     875 mg      2-14 02-25                          tablet by           ity of



     tablet      00:00: 05:59                          mouth in           [place]



               00   :00                           the            Medical



                                                  morning           Branch



                                                  and 1           



                                                  tablet in           



                                                  the            



                                                  evening.           



                                                  Do all           



                                                  this for           



                                                  10 days.           

 

     amoxicillin      2023- No        [id number] 875mg      Take 1        

   Univers



     875 mg      2-14 02-25                          tablet by           ity of



     tablet      00:00: 05:59                          mouth in           [place]



               00   :00                           the            Medical



                                                  morning           Branch



                                                  and 1           



                                                  tablet in           



                                                  the            



                                                  evening.           



                                                  Do all           



                                                  this for           



                                                  10 days.           

 

     ondansetron      2022- No             8mg       8 mg, Slow          

 Univers



     (ZOFRAN      -                          IV Push,           ity of



     (PF))      18:45: 17:55                          ONCE, 1           [place]



     injection 8      00   :00                           dose, On           Medi

sapna



     mg                                           22           Branch



                                                  at 1345,           



                                                  ASAP           

 

     NaCl 0.9%      -0 - No             1000mL      at 999           Uni

vers



     (NS) bolus      5-04 05-04                          mL/hr,           ity of



     infusion      18:45: 20:16                          1,000 mL,           [place]

as



     1,000 mL      00   :00                           IV             Medical



                                                  Infusion,           Branch



                                                  ONCE, 1           



                                                  dose, On           



                                                  22           



                                                  at 1345,           



                                                  ASAP           

 

     dicyclomine      -0      Yes       [id number] 20mg      Take 1           U

nivers



     20 mg      5-04                               tablet by           ity of



     tablet      00:00:                               mouth 4           [place]



               00                                 (four)           Medical



                                                  times           Branch



                                                  daily as           



                                                  needed for           



                                                  Abdominal           



                                                  pain.           

 

     ondansetron      -0      Yes       [id number] 4mg       Take 1           U

nivers



     4 mg      5-04                               tablet by           ity of



     disintegrat      00:00:                               mouth           [place]



     ing tablet      00                                 every 8           Medica

l



                                                  (eight)           Branch



                                                  hours as           



                                                  needed for           



                                                  Nausea and           



                                                  Vomiting           



                                                  (N/V).           

 

     dicyclomine      -0      Yes       [id number] 20mg      Take 1           [facility]

[facility]



     20 mg      5-04                               tablet by           [facility]



     tablet      00:00:                               mouth 4           [place]



               00                                 (four)           [facility]



                                                  times           [facility]



                                                  daily as           



                                                  needed for           



                                                  Abdominal           



                                                  pain.           

 

     ondansetron      -0      Yes       [id number] 4mg       Take 1           [facility]

[facility]



     4 mg      5-04                               tablet by           [facility]



     disintegrat      00:00:                               mouth           [place]



     ing tablet      00                                 every 8           [facility]

l



                                                  (eight)           [facility]



                                                  hours as           



                                                  needed for           



                                                  Nausea and           



                                                  Vomiting           



                                                  (N/V).           

 

     dicyclomine      2-0      Yes       [id number] 20mg      Take 1           [facility]

[facility]



     20 mg      5-04                               tablet by           [facility]



     tablet      00:00:                               mouth 4           [place]



               00                                 (four)           [facility]



                                                  times           [facility]



                                                  daily as           



                                                  needed for           



                                                  Abdominal           



                                                  pain.           

 

     ondansetron      2-0      Yes       [id number] 4mg       Take 1           [facility]

[facility]



     4 mg      5-04                               tablet by           [facility]



     disintegrat      00:00:                               mouth           [place]



     ing tablet      00                                 every 8           [facility]

l



                                                  (eight)           [facility]



                                                  hours as           



                                                  needed for           



                                                  Nausea and           



                                                  Vomiting           



                                                  (N/V).           

 

     dicyclomine      2-0      Yes       [id number] 20mg      Take 1           [facility]

[facility]



     20 mg      5-04                               tablet by           [facility]



     tablet      00:00:                               mouth 4           [place]



               00                                 (four)           [facility]



                                                  times           [facility]



                                                  daily as           



                                                  needed for           



                                                  Abdominal           



                                                  pain.           

 

     ondansetron      2-0      Yes       [id number] 4mg       Take 1           [facility]

[facility]



     4 mg      5-04                               tablet by           [facility]



     disintegrat      00:00:                               mouth           [place]



     ing tablet      00                                 every 8           [facility]

l



                                                  (eight)           [facility]



                                                  hours as           



                                                  needed for           



                                                  Nausea and           



                                                  Vomiting           



                                                  (N/V).           

 

     dicyclomine      2022-0      Yes       [id number] 20mg      Take 1           [facility]

[facility]



     20 mg      5-04                               tablet by           [facility]



     tablet      00:00:                               mouth 4           [place]



               00                                 (four)           [facility]



                                                  times           [facility]



                                                  daily as           



                                                  needed for           



                                                  Abdominal           



                                                  pain.           

 

     ondansetron      -0      Yes       [id number] 4mg       Take 1           [facility]

[facility]



     4 mg      5-04                               tablet by           [facility]



     disintegrat      00:00:                               mouth           [place]



     ing tablet      00                                 every 8           [facility]

l



                                                  (eight)           [facility]



                                                  hours as           



                                                  needed for           



                                                  Nausea and           



                                                  Vomiting           



                                                  (N/V).           

 

     dicyclomine      2-0      Yes       [id number] 20mg      Take 1           [facility]

[facility]



     20 mg      5-04                               tablet by           [facility]



     tablet      00:00:                               mouth 4           [place]



               00                                 (four)           [facility]



                                                  times           [facility]



                                                  daily as           



                                                  needed for           



                                                  Abdominal           



                                                  pain.           

 

     ondansetron      -0      Yes       [id number] 4mg       Take 1           [facility]

[facility]



     4 mg      5-04                               tablet by           [facility]



     disintegrat      00:00:                               mouth           [place]



     ing tablet      00                                 every 8           [facility]

l



                                                  (eight)           [facility]



                                                  hours as           



                                                  needed for           



                                                  Nausea and           



                                                  Vomiting           



                                                  (N/V).           

 

     dicyclomine      2-0      Yes       [id number] 20mg      Take 1           [facility]

[facility]



     20 mg      5-04                               tablet by           [facility]



     tablet      00:00:                               mouth 4           [place]



               00                                 (four)           [facility]



                                                  times           [facility]



                                                  daily as           



                                                  needed for           



                                                  Abdominal           



                                                  pain.           

 

     ondansetron      -0      Yes       [id number] 4mg       Take 1           [facility]

[facility]



     4 mg      5-04                               tablet by           [facility]



     disintegrat      00:00:                               mouth           [place]



     ing tablet      00                                 every 8           [facility]

l



                                                  (eight)           [facility]



                                                  hours as           



                                                  needed for           



                                                  Nausea and           



                                                  Vomiting           



                                                  (N/V).           

 

     ondansetron      2021      Yes       [id number] 4mg       Take 1           

[facility]



     4 mg      2-13                               tablet by           [facility]



     disintegrat      00:00:                               mouth           [place]



     ing tablet      00                                 every 8           [facility]

l



                                                  (eight)           [facility]



                                                  hours as           



                                                  needed for           



                                                  Nausea and           



                                                  Vomiting           



                                                  (N/V).           

 

     ondansetron      -      Yes       [id number] 4mg       Take 1           

[facility]



     4 mg      2-13                               tablet by           [facility]



     disintegrat      00:00:                               mouth           [place]



     ing tablet      00                                 every 8           [facility]

l



                                                  (eight)           [facility]



                                                  hours as           



                                                  needed for           



                                                  Nausea and           



                                                  Vomiting           



                                                  (N/V).           

 

     ondansetron      2021      Yes       [id number] 4mg       Take 1           

[facility]



     4 mg      2-13                               tablet by           [facility]



     disintegrat      00:00:                               mouth           [place]



     ing tablet      00                                 every 8           [facility]

l



                                                  (eight)           [facility]



                                                  hours as           



                                                  needed for           



                                                  Nausea and           



                                                  Vomiting           



                                                  (N/V).           

 

     ondansetron      2021      Yes       [id number] 4mg       Take 1           

[facility]



     4 mg      2-13                               tablet by           [facility]



     disintegrat      00:00:                               mouth           [place]



     ing tablet      00                                 every 8           [facility]

l



                                                  (eight)           [facility]



                                                  hours as           



                                                  needed for           



                                                  Nausea and           



                                                  Vomiting           



                                                  (N/V).           

 

     ondansetron      2021      Yes       [id number] 4mg       Take 1           

[facility]



     4 mg      2-13                               tablet by           [facility]



     disintegrat      00:00:                               mouth           [place]



     ing tablet      00                                 every 8           [facility]

l



                                                  (eight)           [facility]



                                                  hours as           



                                                  needed for           



                                                  Nausea and           



                                                  Vomiting           



                                                  (N/V).           

 

     ondansetron      2021      Yes       [id number] 4mg       Take 1           

[facility]



     4 mg      2-13                               tablet by           [facility]



     disintegrat      00:00:                               mouth           [place]



     ing tablet      00                                 every 8           [facility]

l



                                                  (eight)           [facility]



                                                  hours as           



                                                  needed for           



                                                  Nausea and           



                                                  Vomiting           



                                                  (N/V).           

 

     ondansetron      2021      Yes       [id number] 4mg       Take 1           

[facility]



     4 mg      2-13                               tablet by           [facility]



     disintegrat      00:00:                               mouth           [place]



     ing tablet      00                                 every 8           [facility]

l



                                                  (eight)           [facility]



                                                  hours as           



                                                  needed for           



                                                  Nausea and           



                                                  Vomiting           



                                                  (N/V).           

 

     ondansetron      2021      Yes       [id number] 4mg       Take 1           

[facility]



     4 mg      2-13                               tablet by           [facility]



     disintegrat      00:00:                               mouth           [place]



     ing tablet      00                                 every 8           [facility]

[facility]



                                                  (eight)           [facility]



                                                  hours as           



                                                  needed for           



                                                  Nausea and           



                                                  Vomiting           



                                                  (N/V).           

 

     ondansetron      2021      Yes       [id number] 4mg       Take 1           

[facility]



     4 mg      2-13                               tablet by           [facility]



     disintegrat      00:00:                               mouth           [place]



     ing tablet      00                                 every 8           [facility]

l



                                                  (eight)           [facility]



                                                  hours as           



                                                  needed for           



                                                  Nausea and           



                                                  Vomiting           



                                                  (N/V).           

 

     ondansetron      2021      Yes       [id number] 4mg       Take 1           

[facility]



     4 mg      2-13                               tablet by           [facility]



     disintegrat      00:00:                               mouth           [place]



     ing tablet      00                                 every 8           [facility]

l



                                                  (eight)           [facility]



                                                  hours as           



                                                  needed for           



                                                  Nausea and           



                                                  Vomiting           



                                                  (N/V).           

 

     ondansetron      2021      Yes       [id number] 4mg       Take 1           

[facility]



     4 mg      2-13                               tablet by           [facility]



     disintegrat      00:00:                               mouth           [place]



     ing tablet      00                                 every 8           [facility]

l



                                                  (eight)           [facility]



                                                  hours as           



                                                  needed for           



                                                  Nausea and           



                                                  Vomiting           



                                                  (N/V).           

 

     ondansetron      2021      Yes       [id number] 4mg       Take 1           

[facility]



     4 mg      2-13                               tablet by           [facility]



     disintegrat      00:00:                               mouth           [place]



     ing tablet      00                                 every 8           [facility]

l



                                                  (eight)           [facility]



                                                  hours as           



                                                  needed for           



                                                  Nausea and           



                                                  Vomiting           



                                                  (N/V).           

 

     ondansetron      2021      Yes       [id number] 4mg       Take 1           

[facility]



     4 mg      2-13                               tablet by           [facility]



     disintegrat      00:00:                               mouth           [place]



     ing tablet      00                                 every 8           [facility]

l



                                                  (eight)           [facility]



                                                  hours as           



                                                  needed for           



                                                  Nausea and           



                                                  Vomiting           



                                                  (N/V).           

 

     ondansetron      2021      Yes       [id number] 4mg       Take 1           

[facility]



     4 mg      2-13                               tablet by           [facility]



     disintegrat      00:00:                               mouth           [place]



     ing tablet      00                                 every 8           [facility]

l



                                                  (eight)           [facility]



                                                  hours as           



                                                  needed for           



                                                  Nausea and           



                                                  Vomiting           



                                                  (N/V).           

 

     ondansetron      2021- No        [id number] 4mg       Take 1         

  [facility]



     4 mg      0-11 12-13                          tablet by           [facility]



     disintegrat      00:00: 00:00                          mouth           [place]

[facility]



     ing tablet      00   :00                           every 8           [facility]

l



                                                  (eight)           [facility]



                                                  hours as           



                                                  needed for           



                                                  Nausea and           



                                                  Vomiting           



                                                  (N/V).           

 

     amphetamine            Yes       [id number] 5mg       Take 1           

[facility]



     -dextroamph      1-15                               capsule by           [facility]

[facility] of



     etamine      00:00:                               mouth           [place]



     (ADDERALL      00                                 every           [facility]



     XR) 5 mg 24                                         morning.           [facility]

[facility]



     hr capsule                                                        

 

     FLUoxetine            Yes       [id number] 10mg      Take 1           [facility]

[facility]



     10 mg      1-15                               capsule by           [facility]



     capsule      00:00:                               mouth           [place]



               00                                 daily.           [facility]



                                                                 [facility]

 

     traZODone            Yes       [id number] 50mg      Take 1           [facility]

[facility]



     50 mg      1-15                               tablet by           [facility]



     tablet      00:00:                               mouth at           [place]



               00                                 bedtime.           [facility]



                                                                 [facility]

 

     amphetamine            Yes       [id number] 5mg       Take 1           

[facility]



     -dextroamph      1-15                               capsule by           [facility]

y of



     etamine      00:00:                               mouth           [place]



     (ADDERALL      00                                 every           [facility]



     XR) 5 mg 24                                         morning.           [facility]

[facility]



     hr capsule                                                        

 

     FLUoxetine            Yes       [id number] 10mg      Take 1           [facility]

[facility]



     10 mg      1-15                               capsule by           [facility]



     capsule      00:00:                               mouth           [place]



               00                                 daily.           [facility]



                                                                 [facility]

 

     traZODone      0      Yes       [id number] 50mg      Take 1           [facility]

[facility]



     50 mg      1-15                               tablet by           [facility]



     tablet      00:00:                               mouth at           [place]



               00                                 bedtime.           [facility]



                                                                 [facility]

 

     amphetamine      0      Yes       [id number] 5mg       Take 1           

[facility]



     -dextroamph      1-15                               capsule by           [facility]

y of



     etamine      00:00:                               mouth           [place]



     (ADDERALL      00                                 every           [facility]



     XR) 5 mg 24                                         morning.           [facility]

ch



     hr capsule                                                        

 

     FLUoxetine            Yes       [id number] 10mg      Take 1           [facility]

[facility]



     10 mg      1-15                               capsule by           [facility]



     capsule      00:00:                               mouth           [place]



               00                                 daily.           [facility]



                                                                 [facility]

 

     traZODone            Yes       [id number] 50mg      Take 1           [facility]

n[facility]



     50 mg      1-15                               tablet by           [facility]



     tablet      00:00:                               mouth at           [place]



               00                                 bedtime.           Medical



                                                                 Branch

 

     amphetamine            Yes       [id number] 5mg       Take 1           

[facility]



     -dextroamph      1-15                               capsule by           it

y of



     etamine      00:00:                               mouth           [place]



     (ADDERALL      00                                 every           Medical



     XR) 5 mg 24                                         morning.           [facility]

ch



     hr capsule                                                        

 

     FLUoxetine            Yes       [id number] 10mg      Take 1           U

nivers



     10 mg      1-15                               capsule by           [facility] of



     capsule      00:00:                               mouth           [place]



               00                                 daily.           Medical



                                                                 Branch

 

     traZODone            Yes       [id number] 50mg      Take 1           U

nivers



     50 mg      1-15                               tablet by           [facility] of



     tablet      00:00:                               mouth at           [place]



               00                                 bedtime.           Medical



                                                                 Branch

 

     amphetamine            Yes       [id number] 5mg       Take 1           

[facility]



     -dextroamph      1-15                               capsule by           it

y of



     etamine      00:00:                               mouth           [place]



     (ADDERALL      00                                 every           Medical



     XR) 5 mg 24                                         morning.           [facility]

ch



     hr capsule                                                        

 

     FLUoxetine            Yes       [id number] 10mg      Take 1           U

nivers



     10 mg      1-15                               capsule by           [facility] of



     capsule      00:00:                               mouth           [place]



               00                                 daily.           Medical



                                                                 Branch

 

     traZODone            Yes       [id number] 50mg      Take 1           U

nivers



     50 mg      1-15                               tablet by           [facility] of



     tablet      00:00:                               mouth at           [place]



               00                                 bedtime.           Medical



                                                                 Branch

 

     amphetamine            Yes       [id number] 5mg       Take 1           

[facility]



     -dextroamph      1-15                               capsule by           it

y of



     etamine      00:00:                               mouth           [place]



     (ADDERALL      00                                 every           Medical



     XR) 5 mg 24                                         morning.           [facility]

ch



     hr capsule                                                        

 

     FLUoxetine            Yes       [id number] 10mg      Take 1           U

nivers



     10 mg      1-15                               capsule by           [facility] of



     capsule      00:00:                               mouth           [place]



               00                                 daily.           Medical



                                                                 Branch

 

     traZODone            Yes       [id number] 50mg      Take 1           U

nivers



     50 mg      1-15                               tablet by           [facility] of



     tablet      00:00:                               mouth at           [place]



               00                                 bedtime.           Medical



                                                                 Branch

 

     amphetamine      0      Yes       [id number] 5mg       Take 1           

[facility]



     -dextroamph      1-15                               capsule by           it

y of



     etamine      00:00:                               mouth           [place]



     (ADDERALL      00                                 every           Medical



     XR) 5 mg 24                                         morning.           [facility]

ch



     hr capsule                                                        

 

     FLUoxetine            Yes       [id number] 10mg      Take 1           U

nivers



     10 mg      1-15                               capsule by           [facility] of



     capsule      00:00:                               mouth           [place]



               00                                 daily.           Medical



                                                                 Branch

 

     traZODone            Yes       [id number] 50mg      Take 1           U

nivers



     50 mg      1-15                               tablet by           [facility] of



     tablet      00:00:                               mouth at           [place]



               00                                 bedtime.           Medical



                                                                 Branch

 

     amphetamine      0      Yes       [id number] 5mg       Take 1           

[facility]



     -dextroamph      1-15                               capsule by           it

y of



     etamine      00:00:                               mouth           [place]



     (ADDERALL      00                                 every           Medical



     XR) 5 mg 24                                         morning.           [facility]

ch



     hr capsule                                                        

 

     FLUoxetine            Yes       [id number] 10mg      Take 1           U

nivers



     10 mg      1-15                               capsule by           ity of



     capsule      00:00:                               mouth           [place]



               00                                 daily.           Medical



                                                                 Branch

 

     traZODone            Yes       [id number] 50mg      Take 1           U

nivers



     50 mg      1-15                               tablet by           [facility] of



     tablet      00:00:                               mouth at           [place]



               00                                 bedtime.           Medical



                                                                 Branch

 

     amphetamine      0      Yes       [id number] 5mg       Take 1           

[facility]



     -dextroamph      1-15                               capsule by           it

y of



     etamine      00:00:                               mouth           [place]



     (ADDERALL      00                                 every           Medical



     XR) 5 mg 24                                         morning.           [facility]

ch



     hr capsule                                                        

 

     FLUoxetine            Yes       [id number] 10mg      Take 1           U

nivers



     10 mg      1-15                               capsule by           ity of



     capsule      00:00:                               mouth           [place]



               00                                 daily.           Medical



                                                                 Branch

 

     traZODone            Yes       [id number] 50mg      Take 1           U

nivers



     50 mg      1-15                               tablet by           [facility] of



     tablet      00:00:                               mouth at           [place]



               00                                 bedtime.           Medical



                                                                 Branch

 

     amphetamine      -0      Yes       [id number] 5mg       Take 1           

[facility]



     -dextroamph      1-15                               capsule by           it

y of



     etamine      00:00:                               mouth           [place]



     (ADDERALL      00                                 every           Medical



     XR) 5 mg 24                                         morning.           [facility]

ch



     hr capsule                                                        

 

     FLUoxetine      -0      Yes       [id number] 10mg      Take 1           U

nivers



     10 mg      1-15                               capsule by           [facility] of



     capsule      00:00:                               mouth           [place]



               00                                 daily.           Medical



                                                                 Branch

 

     traZODone      0      Yes       [id number] 50mg      Take 1           U

nivers



     50 mg      1-15                               tablet by           [facility]



     tablet      00:00:                               mouth at           [place]



               00                                 bedtime.           Medical



                                                                 Branch

 

     amphetamine      -0      Yes       [id number] 5mg       Take 1           

[facility]



     -dextroamph      1-15                               capsule by           it

y of



     etamine      00:00:                               mouth           [place]



     (ADDERALL      00                                 every           Medical



     XR) 5 mg 24                                         morning.           [facility]

ch



     hr capsule                                                        

 

     FLUoxetine      -0      Yes       [id number] 10mg      Take 1           U

nivers



     10 mg      1-15                               capsule by           [facility] of



     capsule      00:00:                               mouth           [place]



               00                                 daily.           Medical



                                                                 Branch

 

     traZODone            Yes       [id number] 50mg      Take 1           U

nivers



     50 mg      1-15                               tablet by           [facility] of



     tablet      00:00:                               mouth at           [place]



               00                                 bedtime.           Medical



                                                                 Branch

 

     amphetamine      -0      Yes       [id number] 5mg       Take 1           

[facility]



     -dextroamph      1-15                               capsule by           it

y of



     etamine      00:00:                               mouth           [place]



     (ADDERALL      00                                 every           Medical



     XR) 5 mg 24                                         morning.           [facility]

ch



     hr capsule                                                        

 

     FLUoxetine      -0      Yes       [id number] 10mg      Take 1           U

nivers



     10 mg      1-15                               capsule by           ity of



     capsule      00:00:                               mouth           [place]



               00                                 daily.           Medical



                                                                 Branch

 

     traZODone      0      Yes       [id number] 50mg      Take 1           U

nivers



     50 mg      1-15                               tablet by           [facility] of



     tablet      00:00:                               mouth at           [place]



               00                                 bedtime.           Medical



                                                                 Branch

 

     amphetamine      -0      Yes       [id number] 5mg       Take 1           

[facility]



     -dextroamph      1-15                               capsule by           it

y of



     etamine      00:00:                               mouth           [place]



     (ADDERALL      00                                 every           Medical



     XR) 5 mg 24                                         morning.           [facility]

ch



     hr capsule                                                        

 

     FLUoxetine      -0      Yes       [id number] 10mg      Take 1           U

nivers



     10 mg      1-15                               capsule by           [facility] of



     capsule      00:00:                               mouth           [place]



               00                                 daily.           Medical



                                                                 Branch

 

     traZODone            Yes       [id number] 50mg      Take 1           U

nivers



     50 mg      1-15                               tablet by           ity of



     tablet      00:00:                               mouth at           [place]



               00                                 bedtime.           Medical



                                                                 Branch

 

     amphetamine            Yes       [id number] 5mg       Take 1           

[facility]



     -dextroamph      1-15                               capsule by           it

y of



     etamine      00:00:                               mouth           [place]



     (ADDERALL      00                                 every           Medical



     XR) 5 mg 24                                         morning.           [facility]

ch



     hr capsule                                                        

 

     FLUoxetine            Yes       [id number] 10mg      Take 1           U

nivers



     10 mg      1-15                               capsule by           ity of



     capsule      00:00:                               mouth           [place]



               00                                 daily.           Medical



                                                                 Branch

 

     traZODone            Yes       [id number] 50mg      Take 1           U

nivers



     50 mg      1-15                               tablet by           ity of



     tablet      00:00:                               mouth at           [place]



               00                                 bedtime.           Medical



                                                                 Branch







Immunizations







           Ordered    Filled Immunization Date       Status     Comments   [facility]

e



           Immunization Name Name                                        

 

           Pneumococcal 13            2021-01-15 Completed             [facility]

[facility]



           Conjugate, PCV13            00:00:00                         [place] [facility]

[facility]



           (Prevnar 13)                                             [facility]

 

           Pneumococcal 13            2021-01-15 Completed             [facility]

[facility] of



           Conjugate, PCV13            00:00:00                         [place] [facility]

[facility]



           (Prevnar 13)                                             [facility]

 

           Pneumococcal 13            2021-01-15 Completed             [facility]

[facility] of



           Conjugate, PCV13            00:00:00                         [place] [facility]

[facility]



           (Prevnar 13)                                             [facility]

 

           Pneumococcal 13            2021-01-15 Completed             [facility]

[facility]



           Conjugate, PCV13            00:00:00                         [place] [facility]

[facility]



           (Prevnar 13)                                             [facility]anch

 

           Pneumococcal 13            2021-01-15 Completed             [facility]

[facility] of



           Conjugate, PCV13            00:00:00                         [place] [facility]

[facility]



           (Prevnar 13)                                             [facility]

 

           Pneumococcal 13            2021-01-15 Completed             [facility]

[facility] of



           Conjugate, PCV13            00:00:00                         [place] [facility]

[facility]



           (Prevnar 13)                                             [facility]

 

           Pneumococcal 13            2021-01-15 Completed             [facility]

[facility] of



           Conjugate, PCV13            00:00:00                         [place] [facility]

[facility]



           (Prevnar 13)                                             [facility]

 

           Pneumococcal 13            2021-01-15 Completed             [facility]

[facility] of



           Conjugate, PCV13            00:00:00                         [place] [facility]

[facility]



           (Prevnar 13)                                             [facility]

 

           Pneumococcal 13            2021-01-15 Completed             [facility]

[facility] of



           Conjugate, PCV13            00:00:00                         [place] [facility]

[facility]



           (Prevnar 13)                                             [facility]anch

 

           Pneumococcal 13            2021-01-15 Completed             [facility]

[facility] of



           Conjugate, PCV13            00:00:00                         [place] [facility]

[facility]



           (Prevnar 13)                                             [facility]anch

 

           Pneumococcal 13            2021-01-15 Completed             [facility]

[facility] of



           Conjugate, PCV13            00:00:00                         [place] [facility]

[facility]



           (Prevnar 13)                                             [facility]anch

 

           Pneumococcal 13            2021-01-15 Completed             [facility]

[facility] of



           Conjugate, PCV13            00:00:00                         [place] [facility]

[facility]



           (Prevnar 13)                                             [facility]anch

 

           Pneumococcal 13            2021-01-15 Completed             [facility]

[facility] of



           Conjugate, PCV13            00:00:00                         [place] [facility]

[facility]



           (Prevnar 13)                                             Branch

 

           Pneumococcal 13            2021-01-15 Completed             [facility]

y of



           Conjugate, PCV13            00:00:00                         [place] [facility]

[facility]



           (Prevnar 13)                                             Branch

 

           TDAP                  2019 Completed             [facility]



                                 00:00:00                         [facility]

 

           TDAP                  2019 Completed             [facility]



                                 00:00:00                         [facility]

 

           TDAP                  2019 Completed             [facility]



                                 00:00:00                         [facility]

 

           TDAP                  2019 Completed             [facility]



                                 00:00:00                         [facility]

 

           TDAP                  2019 Completed             [facility]



                                 00:00:00                         [facility]

 

           TDAP                  2019 Completed             [facility]



                                 00:00:00                         [facility]

 

           TDAP                  2019 Completed             [facility]



                                 00:00:00                         [facility]

 

           TDAP                  2019 Completed             [facility]



                                 00:00:00                         [facility]

 

           TDAP                  2019 Completed             [facility]



                                 00:00:00                         [facility]

 

           TDAP                  2019 Completed             [facility]



                                 00:00:00                         [facility]

 

           TDAP                  2019 Completed             [facility]



                                 00:00:00                         [facility]

 

           TDAP                  2019 Completed             [facility]



                                 00:00:00                         [facility]

 

           TDAP                  2019 Completed             [facility]



                                 00:00:00                         [facility]

 

           TDAP                  2019 Completed             [facility]



                                 00:00:00                         [facility]

 

           Influenza Virus            2018-10-23 Completed             [facility]

y of



           Vaccine - Whole            00:00:00                         [facility]

 

           Influenza Virus            2018-10-23 Completed             [facility]

y of



           Vaccine - Whole            00:00:00                         [facility]

 

           Influenza Virus            2018-10-23 Completed             [facility]

[facility] of



           Vaccine - Whole            00:00:00                         [facility]

 

           Influenza Virus            2018-10-23 Completed             [facility]

y of



           Vaccine - Whole            00:00:00                         [facility]

 

           Influenza Virus            2018-10-23 Completed             [facility]

[facility] of



           Vaccine - Whole            00:00:00                         [facility]

 

           Influenza Virus            2018-10-23 Completed             [facility]

[facility] of



           Vaccine - Whole            00:00:00                         [facility]

 

           Influenza Virus            2018-10-23 Completed             [facility]

y of



           Vaccine - Whole            00:00:00                         [facility]

 

           Influenza Virus            2018-10-23 Completed             [facility]

y of



           Vaccine - Whole            00:00:00                         [facility]

 

           Influenza Virus            2018-10-23 Completed             [facility]

y of



           Vaccine - Whole            00:00:00                         [facility]

 

           Influenza Virus            2018-10-23 Completed             [facility]

y of



           Vaccine - Whole            00:00:00                         [facility]

 

           Influenza Virus            2018-10-23 Completed             [facility]

y of



           Vaccine - Whole            00:00:00                         [facility]

 

           Influenza Virus            2018-10-23 Completed             [facility]

[facility] of



           Vaccine - Whole            00:00:00                         [facility]

 

           Influenza Virus            2018-10-23 Completed             [facility]

y of



           Vaccine - Whole            00:00:00                         [facility]

 

           Influenza Virus            2018-10-23 Completed             [facility]

y of



           Vaccine - Whole            00:00:00                         [facility]







Vital Signs







             Vital Name   Observation Time Observation Value Comments     Source

 

             Systolic blood 2023 16:50:00 108 mm[Hg]                [facility]

[facility]



             pressure                                            [facility]

 

             Diastolic blood 2023 16:50:00 76 mm[Hg]                 [facility]

rsity of



             pressure                                            [facility]

 

             Heart rate   2023 16:50:00 84 /min                   [facility]

 

             Body temperature 2023 16:50:00 37.28 Sugey                 [facility]

 

             Respiratory rate 2023 16:50:00 18 /min                   [facility]

 

             Body height  2023 16:50:00 162.6 cm                  [facility]

 

             Body weight  2023 16:50:00 63.248 kg                 [facility]

 

             BMI          2023 16:50:00 23.93 kg/m2               [facility]

 

             Oxygen saturation in 2023 16:50:00 97 /min                   

[facility]



             Arterial blood by                                        [place] [facility]

sapna



             Pulse oximetry                                        Branch

 

             Systolic blood 2022 20:00:00 122 mm[Hg]                Univer

sity of



             pressure                                            [facility]



                                                                 [place]

 

             Diastolic blood 2022 20:00:00 76 mm[Hg]                 Unive

rsity of



             pressure                                            [facility]



                                                                 Branch

 

             Heart rate   2022 20:00:00 61 /min                   Universi

ty of



                                                                 [facility]



                                                                 Branch

 

             Respiratory rate 2022 20:00:00 21 /min                   [facility]

[facility]



                                                                 [facility]

 

             Oxygen saturation in 2022 20:00:00 98 /min                   

[facility]



             Arterial blood by                                        [place] [facility]

sapna



             Pulse oximetry                                        Branch

 

             Body temperature 2022 17:33:00 36.22 Sugey                 Univ

ersity of



                                                                 [facility]

 

             Body weight  2022 17:33:00 62.596 kg                 Universi

ty of



                                                                 [facility]

 

             BMI          2022 17:33:00 20.38 kg/m2               Universi

ty of



                                                                 [facility]

 

             Systolic blood 2021 17:50:00 130 mm[Hg]                Univer

sity of



             pressure                                            [facility]



                                                                 [place]

 

             Diastolic blood 2021 17:50:00 82 mm[Hg]                 Unive

rsity of



             pressure                                            [facility]

 

             Heart rate   2021 17:50:00 81 /min                   Universi

ty of



                                                                 [facility]



                                                                 [place]

 

             Body temperature 2021 17:50:00 36.56 Sugey                 Univ

[facility]



                                                                 [facility]



                                                                 [place]

 

             Respiratory rate 2021 17:50:00 17 /min                   Univ

[facility]



                                                                 [place] [facility]



                                                                 [place]

 

             Body height  2021 17:50:00 175.3 cm                  Universi

ty of



                                                                 [facility]



                                                                 [place]

 

             Body weight  2021 17:50:00 62.143 kg                 Universi

ty of



                                                                 [facility]



                                                                 Br[facility]

 

             BMI          2021 17:50:00 20.23 kg/m2               Universi

ty of



                                                                 [facility]

 

             Oxygen saturation in 2021 17:50:00 97 /min                   

[facility]



             Arterial blood by                                        [place] [facility]

sapna



             Pulse oximetry                                        Branch







Procedures







                Procedure       Date / Time     Performing Clinician Source



                                Performed                       

 

                POCT MOLECULAR FLU 2023 16:58:00 Unknown, [facility]

[facility]

 

                CONSENT/REFUSAL FOR 2023 16:41:02 Doctor Unassigned, [facility]



                DIAGNOSIS AND TREATMENT                 [facility]         [facility] 

Branch

 

                XR CHEST 1 VW   2022 18:24:19 Kaylah Menjivar [facility]

 

                LIPASE          2022 17:51:00 Kyalah Menjivar [facility]

 

                TROPONIN I      2022 17:51:00 Kaylah Menjivar [facility]

 

                COMP. METABOLIC PANEL 2022 17:51:00 Kaylah Menjivar [facility]



                ([id number])                                         [facility]

 

                CBC WITH DIFF   2022 17:51:00 Kaylah Menjivar [facility]

 

                URINALYSIS      2022 17:51:00 Kaylah Menjivar [facility]

 

                N-TERMINAL PRO-BNP 2022 17:51:00 Kaylah Menjivar [facility]

 

                POCT PREGNANCY TEST 2022 17:45:00 Kaylah Menjivar [facility]

 

                NOTICE OF PRIVACY 2022 17:21:40 Doctor Unatamaraigned, [facility]



                PRACTICES                       [facility]

 

                CONSENT/REFUSAL FOR 2022 17:21:28 Doctor Sunny, [facility]



                DIAGNOSIS AND TREATMENT                 [facility]         [facility]

 

                ASSIGNMENT OF BENEFITS 2022 22:07:57 Doctor Sunny, 

[facility]



                                                [facility]

 

                POCT MOLECULAR FLU 2021 18:02:00 Abbey Bryan

[facility]

 

                PATIENT FINANCIAL 1900 06:01:00 Doctor Sunny, [facility]



                RESPONSIBILITY - ALL                 No Name         Medical Bra

[facility]



                FORMS                                           







Encounters







        Start   End     Encounter Admission Attending Care    Care    Encounter 

Source



        Date/Time Date/Time Type    Type    Clinicians Facility Department ID   

   

 

        2023 Outpatient R       IQRA [facility]    [facility]    [id number]

[id number] [facility]



        10:20:00 11:09:10                 ANA                           [facility]

 

        2023 Urgent          Ana Escalona [facility]    1.2.840.114

 [id number] [facility]



        10:20:00 11:09:10 Care            Unknown, Attending HEALTH  350.1.13.10

         [facility] 4.2.7.2.686         [place]

[place]



                                                LEO?BLEA [phone]         [address]



                                                MEDICAL                 



                                                OFFICE                  



                                                BUILDING                 

 

        2023 Orders          Doctor  ROSHNI    1.2.840.114 [id number]

[id number] Univers



        00:00:00 00:00:00 Only            Unassigned, KESHAWN   350.1.13.10       

  ity of



                                        [facility] 4.2.7.2.686         [place]

as



                                                        [phone]         Medi

sapna



                                                        [address]             [place]

 

        2023 Letter          Iqra [facility]    1.2.840.114 [id number]

[id number] Univers



        00:00:00 00:00:00 (Out)           Rania   HEALTH  350.1.13.10         it

y of



                                                [place] 4.2.7.2.686         [place]

as



                                                LEO?BLEA [phone]         [address]                 

 

        2022 Telephone         Tawana Sanchez    1.2.840.114 

[id number] Univers



        00:00:00 00:00:00                         KESHAWN   350.1.13.10         it

y of



                                                HOSPITAL 4.2.7.2.686         [place]

as



                                                        [phone]         [address]

 

        2022 Laboratory         Only, Ang Db Test [facility]    1.2.8

40.114 [id number] 

Univers



        19:45:00 20:00:00 Only            Jordyn Adamson HEALTH  350.1.13.10 

        ity of



                                                [place] 4.2.7.2.686         [place]

as



                                                LEO?BLEA [phone]         [address]                 



                                                OFFICE                  



                                                [facility]                 

 

        2022 Outpatient R       MACEY [facility]    [id number]

[id number] Univers



        19:45:00 19:38:02                 JORDYN delvalley o

f



                                                                        [facility]

 

        2022 Letter          Only, Ang [facility]    1.2.438.889 [id number]

6157 Univers



        00:00:00 00:00:00 (Out)           Db Test HEALTH  350.1.13.10         it

y of



                                                [place] 4.2.7.2.686         [place]

as



                                                LEO?BLEA [phone]         [address]                 



                                                OFFICE                  



                                                [facility]                 

 

        2022 Emergency X       JOHN, [facility]    ERT     [id number]

459 Univers



        12:38:00 15:23:00                 KAYLAH                         ity of



                                                                        [facility]

 

        2022 Emergency         John, [facility]    1.2.840.114 [id number]

[id number] Univers



        12:38:00 15:23:00                 Kaylah DE JESUS 350.1.13.10         i

ty of



                                                [place] 4.2.7.2.686         [place]



                                                [place]  [phone]         Medi

sapna



                                                        [address]             [place]

 

        2022 Orders          Doctor  ROSHNI    1.2.840.114 [id number]

85 Univers



        00:00:00 00:00:00 Only            Unassigned, KESHAWN   350.1.13.10       

  ity of



                                        [facility] 4.2.7.2.686         [place]

as



                                                        [phone]         [address]

 

        2022 Outpatient R       FREDERICK  [facility]    [id number]

180 Univers



        16:00:00 17:04:28                 PEREZ                           ity [facility]

 

        2022 Laboratory         Only, Ang Db Test [facility]    1.2.8

40.114 [id number] 

Univers



        16:00:00 16:15:00 Only            Frederick Perez HEALTH  350.1.13.10      

   ity of



                                                [place] 4.2.7.2.686         [place]

as



                                                LEO?BLEA [phone]         [address]



                                                MEDICAL                 



                                                OFFICE                  



                                                [facility]                 

 

        2022 Orders          Doctor  ROSHNI    1.2.840.114 [id number]

45 Univers



        00:00:00 00:00:00 Only            Unassigned, KESHAWN   350.1.13.10       

  ity of



                                        [facility] 4.2.7.2.686         [place]

as



                                                        [phone]         [address]

 

        2022 Letter          Provider, [facility]    1.2.556.540 [id number]

9664 Univers



        00:00:00 00:00:00 (Out)           Ang Db  HEALTH  350.1.13.10         it

y of



                                        [facility] [place] 4.2.7.2.686        

 [place]



                                                LEO?BLEA [phone]         [address]



                                                MEDICAL                 



                                                OFFICE                  



                                                BUILDING                 

 

        [id number] Letter          Provider, [facility]    1.2.569.596 [id number]

[id number] Univers



        00:00:00 00:00:00 (Out)           Ang   HEALTH  350.1.13.10         it

y of



                                        [facility] 4.2.7.2.686        

 [place]



                                                LEO?BLEA [phone]         [address]



                                                MEDICAL                 



                                                OFFICE                  



                                                [facility]                 

 

        2021 Letter          CurtROSHNI    1.2.840.114 [id number]

00 Univers



        00:00:00 00:00:00 (Out)           Kathi LIAO   350.1.13.10         it

y of



                                                HOSPITAL 4.2.7.2.686         [place]

as



                                                        [phone]         [address]

 

        [facility]    [id number]

989 Univers



        12:00:00 12:18:19                 ABBEY olvieira [facility]

 

        2021 Urgent          [facility]    1.2.840.114 [id number]

84 Univers



        11:42:31 12:02:31 Care            Abbey  HEALTH  350.1.13.10         it

y of



                                                [place] 4.2.7.2.686         [place]

as



                                                LEO?BLEA [phone]         [address]                 



                                                OFFICE                  



                                                [facility]                 

 

        2021 Outpatient SOPHIE BRYAN   [facility]    [id number]

989 Univers



        12:00:00 12:00:00                 ABBEY oliveira [facility]

 

        2021-10-11 2021-10-11 Urgent          Irvin [facility]    1.2.840.114 [id number]

[id number] Univers



        14:59:38 15:39:45 Care            [facility]  350.1.13.10 

        ity of



                                                [place] 4.2.7.2.686         [place]

as



                                                Leo?Blea [phone]         [address]



                                                Medical                 



                                                Office                  



                                                [facility]                 

 

        2021-10-11 2021-10-11 Outpatient R       MACEY [facility]    [id number]

[id number] Univers



        15:00:00 15:00:00                 JORDYN slaughter



                                                                        [facility]

 

        2021-10-11 2021-10-11 Letter          Provider, [facility]    1.2.712.004 [id number]

[id number] Univers



        00:00:00 00:00:00 (Out)           Ang Db  Health  350.1.13.10         it

y of



                                        [facility] [place] 4.2.7.2.686        

 Grzegorz Bailey?Blea [phone]         [facility]    [address]             [place]



                                                Medical                 



                                                Office                  



                                                Building                 

 

        2021 Patient         Mike  [facility]    1.2.840.114 [id number]

94 Univers



        00:00:00 00:00:00 Outreach         Jay Jay  PRIMARY 350.1.13.10         i

ty of



                                        [facility]    4.2.7.2.686         [place]

[place]



                                                PAVILLION [phone]         [facility]



                                                        [address]             [place]

 

        2021-01-15 2021-01-15 Technician         2, [facility]    1.2.840.114 

[id number] Univers



        16:06:30 16:21:30 Visit           Mehdi Louis 350.1.13.10 

        ity 



                                                [place] 4.2.7.2.686         [place]

[place]



                                                [place] [phone]         [facility]     353             [facility]                 

 

        2021-01-15 2021-01-15 Office          [facility]    1.2.840.114 [id number]

[id number] Univers



        14:36:28 16:04:23 Visit           Mehdi De Jesus 350.1.13.10         i

ty of



                                                [place] 4.2.7.2.686         [place]

[place]



                                                [place] [phone]         [facility]     044             [facility]                 

 

        2021-01-15 2021-01-15 Outpatient R       BRYN [facility]    [id number]

[id number] Univers



        14:40:00 14:40:00                 MEHDI oliveira [facility]

 

        2021 Outpatient R       BRYN [facility]    1030

[id number] Univers



        16:00:00 16:00:00                 MEHDI oliveira [facility]

 

        2020 Laboratory         Lab, [facility] Fam Pob I [facility]    1.2.

840.114 [id number] 

Univers



        16:50:15 17:10:15 Only            Clifford Lee  350.1.13.10    

     ity of



                                                [place] 4.2.7.2.686         [place]

as



                                                Professio [phone]         Me

dical



                                                nal     [address]             [place]



                                                Office                  



                                                Building                 



                                                One                     

 

        2020 Outpatient R       ESTEFANY  [facility]    [id number]

162 Univers



        17:00:00 17:00:00                 CLIFFORD oliveira [facility]

 

        2020 Laboratory         Lab, [facility] Pob I [facility]    1.2.

840.114 [id number] 

Univers



        14:58:01 15:18:01 Only            [facility]ord [facility]  350.1.13.10    

     ity of



                                                [place] 4.2.7.2.686         [place]

as



                                                Professio [phone]         [facility]     [address]             [place]



                                                Office                  



                                                [facility]                 



                                                One                     

 

        2020 [facility] R       [facility]    [id number]

064 Univers



        15:00:00 15:00:00                 CLIFFORD oliveira [facility]

 

        2020 [facility]    [id number]

368 Univers



        13:00:00 13:00:00                 CLIFFORD oliveira [facility]

 

        2020 Outpatient R       SHANNONGRISEL [facility]    1026

[id number] Univers



        07:20:00 07:20:00                 MEHDI                           roxanne [facility]

 

        2020 Telephone         ROSHNI Lee    1.2.435.475 [id number]

3194 Univers



        00:00:00 00:00:00                 Clifford LIAO   350.1.13.10         it

y of



                                                HOSPITAL 4.2.7.2.686         [place]

as



                                                        [phone]         [address]

 

        2020 Telephone         Les Meme ROSHNI    1.2.840.114 

[id number] Univers



        00:00:00 00:00:00                         KESHAWN   350.1.13.10         it

y of



                                                HOSPITAL 4.2.7.2.686         [place]

as



                                                        [phone]         [address]

 

        2020-07-10 2020-07-10 Laboratory         Lab, [facility] Fam Pob I [facility]    1.2.

840.114 [id number] 

Univers



        13:22:49 13:42:49 Only            [facility]ord [facility]  350.1.13.10    

     ity of



                                                [place] 4.2.7.2.686         [place]

as



                                                Professio [phone]         [address]



                                                Office                  



                                                Building                 



                                                One                     

 

        2020-07-10 2020-07-10 Outpatient R       ESTEFANY  [facility]    [id number]

507 Univers



        13:20:00 13:20:00                 CLIFFORD oliveira [facility]

 

        2020 Outpatient R       MICHAEL [facility]    [id number]

[id number] Univers



        09:15:00 09:15:00                 JEANMARIE                           roxanne [facility]

 

        2020 Outpatient R       SHANNONGRISEL, [facility]    1026

[id number] Univers



        08:20:00 08:20:00                 MEHDI                           roxanne [facility]

 

        2020 Telephone         DelilahneROSHNI    1.2.280.105 [id number]

2657 Univers



        00:00:00 00:00:00                 Clifford LIAO   350.1.13.10         it

y of



                                                [facility] 4.2.7.2.686         [place]

as



                                                        [phone]         [address]

 

        2020 Urgent          Pob1, [facility]    1.

2.840.114 [id number] 

Univers



        15:38:04 15:58:04 Care            Mehdi Louis  350.1.13.10  

       ity alexandria De Jesus 4.2.7.2.686         [place]

as



                                                Professio [phone]         [address]



                                                Office                  



                                                Building                 



                                                One                     

 

        2020 Outpatient R       BRYN [facility]    1026

[id number] Univers



        07:40:00 07:40:00                 MEHDI oliveira [facility]

 

        2020 Telemedici         [facility]    1.2.840.114 

[id number] Univers



        07:07:26 07:27:26 ne Visit         Mehdi De Jesus 350.1.13.10         

ity [facility] 4.2.7.2.686         [place]

[place]



                                                Professio [phone]         [address]                 

 

        2020 Refill          [facility]    1.2.840.114 753

[id number] Univers



        00:00:00 00:00:00                 Mehdi De Jesus 350.1.13.10         i

ty of



                                                [place] 4.2.7.2.686         [place]

[place]



                                                Professio [phone]         [address]                 

 

        2020 Telemedici         [facility]    1.2.840.114 

[id number] Univers



        07:46:24 08:06:24 ne Visit         Mehdi De Jesus 350.1.13.10         

ity of



                                                [place] 4.2.7.2.686         [place]

[place]



                                                Professio [phone]         [address]                 

 

        2020 [facility] R       [facility]    1026

[id number] Univers



        08:00:00 08:00:00                 MEHDI                           adeliaangie [facility]

 

        2020 [facility] R       [facility]    1026

[id number] Univers



        07:20:00 07:20:00                 BRIAN oliveira 

[facility]

 

        2020 Refill          [facility]    1.2.840.114 748

[id number] Univers



        00:00:00 00:00:00                 Mehdi   [place] 350.1.13.10         i

ty of



                                                [place] 4.2.7.2.686         [place]

[place]



                                                Professio [phone]         [address]                 

 

        2020 Office          [facility]    1.2.840.114 748

[id number] Univers



        08:02:55 09:13:39 Visit           Mehdi De Jesus 350.1.13.10         i

ty of



                                                [place] 4.2.7.2.686         [place]

[place]



                                                Professio [phone]         [address]                 

 

        2020 [facility] R       [facility]    1026

[id number] Univers



        08:00:00 08:00:00                 MEHDI                           adeliaangie [facility]

 

        2020 Orders          Doctor BARAKAT    1.2.840.114 [id number]

[id number] Univers



        00:00:00 00:00:00 Only            Unassigned, KESHAWN   350.1.13.10       

  ity of



                                        [facility] 4.2.7.2.686         [place]

as



                                                        [phone]         [address]

 

        2020 Letter          [facility]    1.2.840.114 [id number]

[id number] Univers



        00:00:00 00:00:00 (Out)           Mehdi De Jesus 350.1.13.10         i

ty [facility] 4.2.7.2.686         [place]

[place]



                                                essio [phone]         Me

dical



                                                nal     044             [facility]                 

 

        2019 Outpatient R       JANNET [facility]    [id number]

[id number] Univers



        09:00:00 09:20:54                 RENÉE                         ity o

f



                                                                        [facility]

 

        2019 Inpatient P       JENNIFER SANDY [facility]    MANUEL   

  [id number] Univers



        07:06:00 15:22:00                 SANDY RODRIGUEZ                     

    itangie [facility]

 

        1900 Orders          Doctor  ROSHNI    1.2.840.114 [id number]

[id number] Univers



        00:00:00 00:00:00 Only            Unassigned, KESHAWN   350.1.13.10       

  ity of



                                        [facility] 4.2.7.2.686         [place]

as



                                                        [phone]         [address]







Results







           Test Description Test Time  Test Comments Results    Result Comments 

Source









                    POCT MOLECULAR FLU  2023 17:10:19 









                      Test Item  Value      Reference Range Interpretation Comme

nts









             POCT Molecular FluA (test code = 97857-2) Negative     Negative    

              

 

             POCT Molecular FluB (test code = 02496-9) Negative     Negative    

              

 

             Lab Interpretation (test code = 90784-8) Normal                    

             



[facility] -33-14 18:55:11





             Test Item    Value        Reference    Interpretation Comments



                                       Range                     

 

             TROPONIN I (test 0.000 ng/mL  See_Comment                [Automated



             code = 0492184482)                                        message] 

The



                                                                 system which



                                                                 generated this



                                                                 result



                                                                 transmitted



                                                                 reference range

:



                                                                 <=0.034. The



                                                                 reference range



                                                                 was not used to



                                                                 interpret this



                                                                 result as



                                                                 normal/abnormal

.

 

             GERSON (test code = Reference (Normal)                           



             GERSON)         Range (defined by                           



                          the 99th percentile                           



                          reference limit): <=                           



                          0.034 ng/mL Note:                           



                          Cardiac troponin                           



                          begins to rise 3-4                           



                          hours after the                           



                          onset of ischemia.                           



                          Repeat in 4-6 hours                           



                          if the sample was                           



                          drawn within 3-4                           



                          hours of the onset                           



                          of the symptom and                           



                          found normal.                           



                          Diagnosis of                           



                          myocardial injury is                           



                          made with acute                           



                          changes in cTn                           



                          concentrations with                           



                          at least one serial                           



                          sample above the                           



                          99th percentile                           



                          upper reference                           



                          limit (URL), taken                           



                          together with the                           



                          patient's clinical                           



                          presentation. Biotin                           



                          has been reported to                           



                          cause a negative                           



                          bias, interpret                           



                          results relative to                           



                          patient's use of                           



                          biotin.                                

 

             Lab Interpretation Normal                                 



             (test code =                                        



             41142-1)                                            



[facility] [id number] 18:51:49





             Test Item    Value        Reference Range Interpretation Comments

 

             NT-proBNP (test code 47 pg/mL     See_Comment                [Autom

ated



             = [id number])                                        message] The



                                                                 system which



                                                                 generated this



                                                                 result



                                                                 transmitted



                                                                 reference range

:



                                                                 <=125. The



                                                                 reference range



                                                                 was not used to



                                                                 interpret this



                                                                 result as



                                                                 normal/abnormal

.

 

             GERSON (test code = GERSON) Biotin has been                           



                          reported to                            



                          cause a negative                           



                          bias, interpret                           



                          results relative                           



                          to patient's use                           



                          of biotin.                             

 

             Lab Interpretation Normal                                 



             (test code = 88928-5)                                        



[facility] Center[facility] MET[facility]OLIC PANEL ([id number])2022 
18:25:25





             Test Item    Value        Reference Range Interpretation Comments

 

             NA (test code = 141 mmol/L   135-145                   



             7835910777)                                         

 

             K (test code = 4.1 mmol/L   3.5-5.0                   



             9164411601)                                         

 

             CL (test code = 106 mmol/L                       



             1680586320)                                         

 

             CO2 TOTAL (test code = 22 mmol/L    23-31        L            



             0712939094)                                         

 

             AGAP (test code =              2-16                      



             7528762577)                                         

 

             BUN (test code = 10 mg/dL     7-23                      



             6110268138)                                         

 

             GLUCOSE (test code = 113 mg/dL           H            



             3271655940)                                         

 

             CREATININE (test code = 0.58 mg/dL   0.50-1.04                 



             4434762908)                                         

 

             TOTAL BILI (test code = 0.7 mg/dL    0.1-1.1                   



             7849736188)                                         

 

             CALCIUM (test code = 9.2 mg/dL    8.6-10.6                  



             8697874686)                                         

 

             T PROTEIN (test code = 7.9 g/dL     6.3-8.2                   



             1736750417)                                         

 

             ALBUMIN (test code = 4.9 g/dL     3.5-5.0                   



             9338241049)                                         

 

             ALK PHOS (test code = 82 U/L                           



             6088198723)                                         

 

             ALTv (test code = 16 U/L                             



             1742-6)                                             

 

             AST(SGOT) (test code = 23 U/L       13-40                     



             2352600933)                                         

 

             eGFR (test code =              mL/min/1.73m2              



             4900458794)                                         

 

             GERSON (test code = GERSON) Association of                           



                          Glomerular Filtration                           



                          Rate (GFR) and Staging                           



                          of Kidney Disease*                           



                          +---------------------                           



                          --+-------------------                           



                          --+-------------------                           



                          ------+| GFR                           



                          (mL/min/1.73 m2) ?|                           



                          With Kidney Damage ?|                           



                          ?Without Kidney                           



                          Damage+---------------                           



                          --------+-------------                           



                          --------+-------------                           



                          ------------+| ?>90 ?                           



                          ? ? ? ? ? ? ? ?|                           



                          ?Stage one ? ? ? ? ?|                           



                          ? Normal ? ? ? ? ? ? ?                           



                          ?+--------------------                           



                          ---+------------------                           



                          ---+------------------                           



                          -------+| ?60-89 ? ? ?                           



                          ? ? ? ? ?| ?Stage two                           



                          ? ? ? ? ?| ? Decreased                           



                          GFR ? ? ? ?                            



                          +---------------------                           



                          --+-------------------                           



                          --+-------------------                           



                          ------+| ?30-59 ? ? ?                           



                          ? ? ? ? ?| ?Stage                           



                          three ? ? ? ?| ? Stage                           



                          three ? ? ? ? ?                           



                          +---------------------                           



                          --+-------------------                           



                          --+-------------------                           



                          ------+| ?15-29 ? ? ?                           



                          ? ? ? ? ?| ?Stage four                           



                          ? ? ? ? | ? Stage four                           



                          ? ? ? ? ?                              



                          ?+--------------------                           



                          ---+------------------                           



                          ---+------------------                           



                          -------+| ?<15 (or                           



                          dialysis) ? ?| ?Stage                           



                          five ? ? ? ? | ? Stage                           



                          five ? ? ? ? ?                           



                          ?+--------------------                           



                          ---+------------------                           



                          ---+------------------                           



                          -------+ *Each stage                           



                          assumes the associated                           



                          GFR level has been in                           



                          effect for at least                           



                          three months. ?Stages                           



                          1 to 5, with or                           



                          without kidney                           



                          disease, indicate                           



                          chronic kidney                           



                          disease. Notes:                           



                          Determination of                           



                          stages one and two                           



                          (with eGFR                             



                          >59mL/min/1.73 m2)                           



                          requires estimation of                           



                          kidney damage for at                           



                          least three months as                           



                          defined by structural                           



                          or functional                           



                          abnormalities of the                           



                          kidney, manifested by                           



                          either:Pathological                           



                          abnormalities or                           



                          Markers of kidney                           



                          damage (including                           



                          abnormalities in the                           



                          composition of the                           



                          blood or urine or                           



                          abnormalities in                           



                          imaging tests).                           

 

             Lab Interpretation Abnormal                               



             (test code = 22598-7)                                        



[facility] CenterLIPASE2022-05-04 18:25:25





             Test Item    Value        Reference Range Interpretation Comments

 

             LIPASE (test code = 3651184032) 36 U/L       0-220                 

    

 

             Lab Interpretation (test code = Normal                             

    



             14136-7)                                            



[facility] CenterCB WITH [id number] 18:22:48





             Test Item    Value        Reference Range Interpretation Comments

 

             WBC (test code =              See_Comment                [Automated



             2690-2)                                             message] The sy

stem



                                                                 which generated



                                                                 this result



                                                                 transmitted



                                                                 reference range

:



                                                                 4.30 - 11.10



                                                                 10*3/?L. The



                                                                 reference range

 was



                                                                 not used to



                                                                 interpret this



                                                                 result as



                                                                 normal/abnormal

.

 

             RBC (test code =              See_Comment                [Automated



             789-8)                                              message] The sy

stem



                                                                 which generated



                                                                 this result



                                                                 transmitted



                                                                 reference range

:



                                                                 3.93 - 5.25



                                                                 10*6/?L. The



                                                                 reference range

 was



                                                                 not used to



                                                                 interpret this



                                                                 result as



                                                                 normal/abnormal

.

 

             HGB (test code = 15.2 g/dL    11.6-15.0    H            



             718-7)                                              

 

             HCT (test code = 43.4 %       35.7-45.2                 



             4544-3)                                             

 

             MCV (test code = 94.6 fL      80.6-95.5                 



             787-2)                                              

 

             MCH (test code = 33.1 pg      25.9-32.8    H            



             785-6)                                              

 

             MCHC (test code = 35.0 g/dL    31.6-35.1                 



             786-4)                                              

 

             RDW-SD (test code = 43.4 fL      39.0-49.9                 



             39027-9)                                            

 

             RDW-CV (test code = 12.4 %       12.0-15.5                 



             788-0)                                              

 

             PLT (test code =              See_Comment                [Automated



             777-3)                                              message] The sy

stem



                                                                 which generated



                                                                 this result



                                                                 transmitted



                                                                 reference range

:



                                                                 166 - 358 10*3/

?L.



                                                                 The reference r

lorenza



                                                                 was not used to



                                                                 interpret this



                                                                 result as



                                                                 normal/abnormal

.

 

             MPV (test code = 9.5 fL       9.5-12.9                  



             38082-7)                                            

 

             NRBC/100 WBC (test              See_Comment                [Automat

ed



             code = 7647255948)                                        message] 

The system



                                                                 which generated



                                                                 this result



                                                                 transmitted



                                                                 reference range

:



                                                                 0.0 - 10.0 /100



                                                                 WBCs. The refer

ence



                                                                 range was not u

sed



                                                                 to interpret th

is



                                                                 result as



                                                                 normal/abnormal

.

 

             NRBC x10^3 (test code <0.01        See_Comment                [Auto

mated



             = [id number])                                        message] The s

ystem



                                                                 which generated



                                                                 this result



                                                                 transmitted



                                                                 reference range

:



                                                                 10*3/?L. The



                                                                 reference range

 was



                                                                 not used to



                                                                 interpret this



                                                                 result as



                                                                 normal/abnormal

.

 

             GRAN MAT (NEUT) % 88.0 %                                 



             (test code = 770-8)                                        

 

             IMM GRAN % (test code 0.30 %                                 



             = [id number])                                        

 

             LYMPH % (test code = 8.2 %                                  



             736-9)                                              

 

             MONO % (test code = 3.2 %                                  



             5905-5)                                             

 

             EOS % (test code = 0.2 %                                  



             713-8)                                              

 

             BASO % (test code = 0.1 %                                  



             706-2)                                              

 

             GRAN MAT x10^3(ANC) 8.46 10*3/uL 1.88-7.09    H            



             (test code =                                        



             5333321704)                                         

 

             IMM GRAN x10^3 (test 0.03 10*3/uL 0.00-0.06                 



             code = [id number])                                        

 

             LYMPH x10^3 (test code 0.79 10*3/uL 1.32-3.29    L            



             = 731-0)                                            

 

             MONO x10^3 (test code 0.31 10*3/uL 0.33-0.92    L            



             = 742-7)                                            

 

             EOS x10^3 (test code = <0.03        0.03-0.39    L            



             711-2)                                              

 

             BASO x10^3 (test code <0.03        0.01-0.07                 



             = 704-7)                                            

 

             Lab Interpretation Abnormal                               



             (test code = 18074-7)                                        



[id number] 17[facility]45:00





             Test Item    Value        Reference Range Interpretation Comments

 

             POCT PREG (test code = 1605) Negative                              

 

 

             On board controls acceptable with Present                          

      



             C Line (test code = 3574)                                        

 

             POCT PREG LOT # (test code = 3575) [id number]                      

       

 

             POCT PREG TEST  DATE (test 2023                        

     



             code = 3576)                                        

 

             Lab Interpretation (test code = Normal                             

    



             23860-7)                                            



[facility] MOLECULAR [id number] 18:14:05





             Test Item    Value        Reference Range Interpretation Comments

 

             POCT Molecular FluA (test code = Negative     Negative             

     



             28274-1)                                            

 

             POCT Molecular FluB (test code = Negative     Negative             

     



             36398-6)                                            

 

             Lab Interpretation (test code = Normal                             

    



             80738-8)                                            



[facility]

## 2023-05-26 NOTE — ER
Nurse's Notes                                                                                     

 [facility]                                                                 

Name: Jenna Vallejo                                                                                  

Age: 37 yrs                                                                                       

Sex: Female                                                                                       

: 1985                                                                                   

MRN: [id number]                                                                                   

Arrival Date: 2023                                                                          

Time: 10:08                                                                                       

Account#: [id number]                                                                            

Bed 17                                                                                            

Private MD:                                                                                       

Diagnosis: Headache                                                                               

                                                                                                  

Presentation:                                                                                     

                                                                                             

10:54 Chief complaint: Patient states: Had a seizure about 2 weeks ago while at work, woke    jl7 

      this morning feeling the same way, foggy, almost like vertigo and like I might pass         

      out. No other hx of seizures. Coronavirus screen: At this time, the client does not         

      indicate any symptoms associated with coronavirus-19. Ebola Screen: No symptoms or          

      risks identified at this time. Initial Sepsis Screen: Does the patient meet any 2           

      criteria? No. Patient's initial sepsis screen is negative. Does the patient have a          

      suspected source of infection? No. Patient's initial sepsis screen is negative. Risk        

      Assessment: Do you want to hurt yourself or someone else? Patient reports no desire to      

      harm self or others. Onset of symptoms was May 26, 2023.                                    

10:54 Method Of Arrival: Ambulatory                                                           [facility] 

10:54 Acuity: STAN 3                                                                           jl7 

                                                                                                  

Triage Assessment:                                                                                

10:56 General: Appears in no apparent distress. uncomfortable, Behavior is cooperative,       jl7 

      anxious, crying. Pain: Denies pain.                                                         

                                                                                                  

OB/GYN:                                                                                           

10:56 LMP 2023                                                                           jl7 

                                                                                                  

Historical:                                                                                       

- Allergies:                                                                                      

10:56 No Known Allergies;                                                                     jl7 

- Home Meds:                                                                                      

10:56 None [Active];                                                                          jl7 

- PMHx:                                                                                           

10:56 None;                                                                                   jl7 

- PSHx:                                                                                           

10:56 Ligation of fallopian tube; breast augmentation;                                        jl7 

                                                                                                  

- Immunization history:: Adult Immunizations unknown.                                             

- Social history:: Smoking status: Patient denies any tobacco usage or history of.                

                                                                                                  

                                                                                                  

Screenin:26 [facility] ED Fall Risk Assessment (Adult) History of falling in the last 3 months,       db  

      including since admission No falls in past 3 months (0 pts) Confusion or Disorientation     

      No (0 pts) Intoxicated or Sedated No (0 pts) Impaired Gait No (0 pts) Mobility Assist       

      Device Used No (0 pt) Altered Elimination No (0 pt) Score/Fall Risk Level 0 - 2 = Low       

      Risk Oriented to surroundings, Maintained a safe environment. Abuse screen: Denies          

      threats or abuse. Denies injuries from another. Nutritional screening: No deficits          

      noted. Tuberculosis screening: No symptoms or risk factors identified.                      

                                                                                                  

Assessment:                                                                                       

10:58 Reassessment: ANN Bailey in triage assessing pt.                                          jl7 

11:19 Reassessment: patient to CT.                                                            db  

12:00 Reassessment: Patient appears in no apparent distress at this time. Patient and/or      db  

      family updated on plan of care and expected duration. Pain level reassessed. Patient is     

      alert, oriented x 3, equal unlabored respirations, skin warm/dry/pink. felt like was        

      going to have a seizure. No seizure today. General: Appears in no apparent distress.        

      comfortable, Behavior is calm, cooperative. Pain: Denies pain. Neuro: Level of              

      Consciousness is awake, alert, obeys commands, Oriented to person, place, time,             

      situation. Respiratory: Airway is patent Respiratory effort is even, unlabored,             

      Respiratory pattern is regular, symmetrical.                                                

12:20 Reassessment: patient ambulatory to restroom.                                           db  

13:05 Reassessment: Patient appears in no apparent distress at this time. Patient and/or      db  

      family updated on plan of care and expected duration. Pain level reassessed. Patient is     

      alert, oriented x 3, equal unlabored respirations, skin warm/dry/pink. patient              

      complains of a headache. Will notify provider.                                              

14:04 Reassessment: Patient appears in no apparent distress at this time. Patient and/or      db  

      family updated on plan of care and expected duration. Pain level reassessed. Patient is     

      alert, oriented x 3, equal unlabored respirations, skin warm/dry/pink. Patient states       

      feeling better. Patient states symptoms have improved.                                      

                                                                                                  

Vital Signs:                                                                                      

10:54  / 103; Pulse 75; Resp 17; Temp 98.7; Pulse Ox 100% ; Weight 58.97 kg; Height 5   jl7 

      ft. 4 in. ; Pain 0/10;                                                                      

11:54  / 83; Pulse 62; Resp 16; Pulse Ox 100% on R/A;                                   db  

12:30  / 70; Pulse 80; Resp 16; Pulse Ox 100% ;                                         db  

13:00  / 67; Pulse 71; Resp 16; Pulse Ox 100% ;                                         db  

13:30  / 81; Pulse 72; Resp 16; Pulse Ox 100% on R/A;                                   db  

10:54 Body Mass Index 22.31 (58.97 kg, 162.56 cm)                                             jl7 

10:54 Pain Scale: Adult                                                                       jl7 

                                                                                                  

ED Course:                                                                                        

10:10 Patient arrived in ED.                                                                  am2 

10:31 [facility]                                                              jmm 

10:31 Daniel Mueller MD is Attending Physician.                                              jmm 

10:56 Triage completed.                                                                       jl7 

10:56 Arm band placed on right wrist.                                                         jl7 

11:13 Emily Hernandez, RN is Primary Nurse.                                                  db  

11:25 CT Head Brain wo Cont In Process Unspecified.                                           EDMS

11:55 Inserted saline lock: 20 gauge in right antecubital area, using aseptic technique.      db  

      Blood collected.                                                                            

12:19 Patient has correct armband on for positive identification. Placed in gown. Bed in low  mm9 

      position. Call light in reach. Side rails up X2. Warm blanket given. Client placed on       

      continuous cardiac and pulse oximetry monitoring. NIBP monitoring applied. Cardiac          

      monitor on. Pulse ox on. NIBP on.                                                           

12:19 EKG done, by ED staff, reviewed by Leo JUAREZ.                                     mm9 

14:08 Remi Vázquez MD is Referral Physician.                                             jmm 

14:28 No provider procedures requiring assistance completed. IV discontinued, intact,         mb9 

      bleeding controlled, No redness/swelling at site. Pressure dressing applied.                

                                                                                                  

Administered Medications:                                                                         

11:55 Drug: NS 0.9% IV 1000 ml Route: IV; Rate: 1 bolus; Site: right antecubital;             db  

13:15 Follow up: Response: No adverse reaction; IV Status: Completed infusion; IV Intake:     db  

      1000ml                                                                                      

11:56 Drug: Ativan IVP 1 mg Route: IVP; Site: right antecubital;                              db  

14:05 Follow up: Response: No adverse reaction                                                db  

13:20 Drug: NS 0.9%  ml Route: IV; Rate: bolus; Site: right antecubital;                db  

14:05 Follow up: Response: No adverse reaction; IV Status: Completed infusion; IV Intake:     db  

      250ml                                                                                       

13:20 Drug: metoCLOPramide IVP 10 mg Route: IVP; Site: right antecubital;                     db  

14:05 Follow up: Response: No adverse reaction                                                db  

13:20 Drug: Decadron - Dexamethasone IVP 10 mg Route: IVP; Site: right antecubital;           db  

14:05 Follow up: Response: No adverse reaction                                                db  

13:20 Drug: Ketorolac IVP 30 mg Route: IVP; Site: right antecubital;                          db  

14:05 Follow up: Response: No adverse reaction                                                db  

                                                                                                  

                                                                                                  

Medication:                                                                                       

12:10 VIS not applicable for this client.                                                     db  

                                                                                                  

Intake:                                                                                           

13:15 IV: 1000ml; Total: 1000ml.                                                              db  

14:05 IV: 250ml; Total: 1250ml.                                                               db  

                                                                                                  

Outcome:                                                                                          

14:08 Discharge ordered by MD.                                                                juan 

14:28 Discharged to home ambulatory.                                                          marvin 

14:28 Condition: stable                                                                           

14:28 Discharge instructions given to patient, Instructed on discharge instructions, follow       

      up and referral plans. Demonstrated understanding of instructions, follow-up care,          

      medications, Prescriptions given X 1.                                                       

14:29 Patient left the ED.                                                                    mb9 

                                                                                                  

Signatures:                                                                                       

Dispatcher [employer]                           EDMS                                                 

Leo Victor PA PA jmm Leal, Jahala, RN                        RN   jl7                                                  

Abbey Garcia Danielle, RN RN db Martinez, Maria mm9                                                  

Marta Madsen, RN                 RN   mb9                                                  

                                                                                                  

**************************************************************************************************

## 2023-05-26 NOTE — RAD REPORT
EXAM DESCRIPTION:  CT - Head Brain Wo Cont - 5/26/2023 11:24 am

 

CLINICAL HISTORY:  Headache

 

COMPARISON:  none

 

TECHNIQUE:  Computed axial tomography of the head was obtained. IV contrast was not requested.

 

All CT scans are performed using dose optimization technique as appropriate and may include automated
 exposure control or mA/KV adjustment according to patient size.

 

FINDINGS:  An intracranial  bleed is not seen

 

The ventricles are normal in caliber

 

No significant hypodense areas within the brain visualized

 

No extra-axial fluid collection is noted.

 

Fluid within the sinuses/ mastoids is not seen

 

IMPRESSION:  No acute intracranial abnormality is seen

 

If patient's symptoms persist  MRI of the brain would be recommended

## 2023-05-26 NOTE — EDPHYS
Physician Documentation                                                                           

 [facility]\""                                                                 

Name: Jenna Vallejo                                                                                  

Age: 37 yrs                                                                                       

Sex: Female                                                                                       

: 1985                                                                                   

MRN: [id number]                                                                                   

Arrival Date: 2023                                                                          

Time: 10:08                                                                                       

Account#: [id number]                                                                            

Bed 17                                                                                            

Private MD:                                                                                       

ED Physician Daniel Mueller                                                                       

HPI:                                                                                              

                                                                                             

10:56 This 37 yrs old  Female presents to ER via Ambulatory with complaints of        jmm 

      Doesn't Feel Right, hx of seizure.                                                          

10:56 Is a 37-year-old female with no known chronic medical conditions presents emerged part  [facility] 

      with complaints of headache, feeling "fuzzy headed," "foggy" which was similar to how       

      she felt prior to a seizure which occurred earlier this month. Patient is not currently     

      taking any seizure medication. Patient does not have a history of epilepsy. .               

                                                                                                  

OB/GYN:                                                                                           

10:56 LMP 2023                                                                           jl7 

                                                                                                  

Historical:                                                                                       

- Allergies:                                                                                      

10:56 No Known Allergies;                                                                     jl7 

- Home Meds:                                                                                      

10:56 None [Active];                                                                          jl7 

- PMHx:                                                                                           

10:56 None;                                                                                   jl7 

- PSHx:                                                                                           

10:56 Ligation of fallopian tube; breast augmentation;                                        jl7 

                                                                                                  

- Immunization history:: Adult Immunizations unknown.                                             

- Social history:: Smoking status: Patient denies any tobacco usage or history of.                

                                                                                                  

                                                                                                  

ROS:                                                                                              

10:56 Constitutional: Negative for fever, chills, and weight loss, Cardiovascular: Negative   jmm 

      for chest pain, palpitations, and edema, Respiratory: Negative for shortness of breath,     

      cough, wheezing, and pleuritic chest pain.                                                  

10:56 Neuro: Positive for headache.                                                               

10:56 All other systems are negative.                                                             

                                                                                                  

Exam:                                                                                             

10:56 Constitutional:  This is a well developed, well nourished patient who is awake, alert,  jmm 

      and in no acute distress. Head/Face:  atraumatic. Eyes:  EOMI, no conjunctival erythema     

      appreciated ENT:  Moist Mucus Membranes Neck:  Trachea midline, Supple Chest/axilla:        

      Normal chest wall appearance and motion.   Cardiovascular:  Regular rate and rhythm.        

      No edema appreciated Respiratory:  Normal respirations, no respiratory distress             

      appreciated Abdomen/GI:  Non distended Back:  Normal ROM Skin:  General appearance          

      color normal MS/ Extremity:  Moves all extremities, no obvious deformities appreciated,     

      no edema noted to the lower extremities  Neuro:  Awake and alert Psych:  Behavior is        

      normal, Mood is normal, Patient is cooperative and pleasant                                 

                                                                                                  

Vital Signs:                                                                                      

10:54  / 103; Pulse 75; Resp 17; Temp 98.7; Pulse Ox 100% ; Weight 58.97 kg; Height 5   jl7 

      ft. 4 in. ; Pain 0/10;                                                                      

11:54  / 83; Pulse 62; Resp 16; Pulse Ox 100% on R/A;                                   db  

12:30  / 70; Pulse 80; Resp 16; Pulse Ox 100% ;                                         db  

13:00  / 67; Pulse 71; Resp 16; Pulse Ox 100% ;                                         db  

13:30  / 81; Pulse 72; Resp 16; Pulse Ox 100% on R/A;                                   db  

10:54 Body Mass Index 22.31 (58.97 kg, 162.56 cm)                                             7 

10:54 Pain Scale: Adult                                                                       jl7 

                                                                                                  

MDM:                                                                                              

10:56 Patient medically screened.                                                             [facility] 

10:56 Differential diagnosis: intracerebral hemorrhage, meningitis, migraine, subarachnoid    jmm 

      bleed, subdural hematoma. Data reviewed: vital signs, nurses notes. ED course: I            

      discussed the patient with Dr. Vázquez who will follow up with the patient next week.      

      He did not recommend seizure medication at this time. Patient advised to return to the      

      ED if symptoms worsen. Patient understood and agrees to plan of care..                      

                                                                                                  

                                                                                             

10:57 Order name: Acetaminophen; Complete Time: 12:54                                         [facility] 

                                                                                             

10:57 Order name: Basic Metabolic Panel; Complete Time: 12:54                                 [facility] 

                                                                                             

10:57 Order name: CBC with Diff; Complete Time: 12:38                                         [facility] 

                                                                                             

10:57 Order name: ETOH Level; Complete Time: 12:54                                            [facility] 

                                                                                             

10:57 Order name: Hepatic Function; Complete Time: 12:54                                      [facility] 

                                                                                             

10:57 Order name: PT-INR; Complete Time: 12:38                                                [facility] 

                                                                                             

10:57 Order name: Ptt, Activated; Complete Time: 12:38                                        [facility] 

                                                                                             

10:57 Order name: Urine Drug Screen; Complete Time: 12:54                                     [facility] 

                                                                                             

10:57 Order name: PREGU; Complete Time: 12:54                                                 [facility] 

                                                                                             

10:57 Order name: CT Head Brain wo Cont; Complete Time: 11:41                                 [facility] 

                                                                                             

10:57 Order name: EKG; Complete Time: 10:59                                                   [facility] 

                                                                                             

10:57 Order name: EKG - Nurse/Tech; Complete Time: 12:27                                      [facility] 

                                                                                             

10:57 Order name: IV Saline Lock; Complete Time: 12:03                                        [facility] 

                                                                                             

10:57 Order name: Labs collected and sent; Complete Time: 12:03                               [facility] 

                                                                                                  

Administered Medications:                                                                         

11:55 Drug: NS 0.9% IV 1000 ml Route: IV; Rate: 1 bolus; Site: right antecubital;             db  

13:15 Follow up: Response: No adverse reaction; IV Status: Completed infusion; IV Intake:     db  

      1000ml                                                                                      

11:56 Drug: Ativan IVP 1 mg Route: IVP; Site: right antecubital;                              db  

14:05 Follow up: Response: No adverse reaction                                                db  

13:20 Drug: NS 0.9%  ml Route: IV; Rate: bolus; Site: right antecubital;                db  

14:05 Follow up: Response: No adverse reaction; IV Status: Completed infusion; IV Intake:     db  

      250ml                                                                                       

13:20 Drug: metoCLOPramide IVP 10 mg Route: IVP; Site: right antecubital;                     db  

14:05 Follow up: Response: No adverse reaction                                                db  

13:20 Drug: Decadron - Dexamethasone IVP 10 mg Route: IVP; Site: right antecubital;           db  

14:05 Follow up: Response: No adverse reaction                                                db  

13:20 Drug: Ketorolac IVP 30 mg Route: IVP; Site: right antecubital;                          db  

14:05 Follow up: Response: No adverse reaction                                                db  

                                                                                                  

                                                                                                  

Disposition Summary:                                                                              

23 14:08                                                                                    

Discharge Ordered                                                                                 

      Location: Home                                                                          [facility] 

      Condition: Stable                                                                       [facility] 

      Diagnosis                                                                                   

        - Headache                                                                            [facility] 

      Followup:                                                                               [facility] 

        - With: Remi Vázquez MD                                                               

        - When: 2 - 3 days                                                                         

        - Reason: Recheck today's complaints, Continuance of care, Re-evaluation by your           

      physician                                                                                   

      Discharge Instructions:                                                                     

        - Discharge Summary Sheet                                                             [facility] 

        - General Headache Without Cause                                                      [facility] 

      Forms:                                                                                      

        - Medication Reconciliation Form                                                      [facility] 

        - Thank You Letter                                                                    [facility] 

        - Antibiotic Education                                                                [facility] 

        - Prescription Opioid Use                                                             [facility] 

        - Work release form                                                                   [id number] 

      Prescriptions:                                                                              

        - Hydroxyzine HCl 25 mg Oral Tablet                                                        

            - take 1 tablet by ORAL route every 6 hours As needed; 30 tablet; Refills: 0,     jmm 

      Product Selection Permitted                                                                 

Signatures:                                                                                       

Dispatcher [employer]                           EDLeo Briseno PA PA jmm Leal Jahala, RN                        RN   jl7                                                  

Emily Hernandez, RN                    RN   db                                                   

                                                                                                  

**************************************************************************************************